# Patient Record
Sex: FEMALE | Race: WHITE | NOT HISPANIC OR LATINO | URBAN - METROPOLITAN AREA
[De-identification: names, ages, dates, MRNs, and addresses within clinical notes are randomized per-mention and may not be internally consistent; named-entity substitution may affect disease eponyms.]

---

## 2019-01-11 ENCOUNTER — COMMUNICATION - HEALTHEAST (OUTPATIENT)
Dept: SCHEDULING | Facility: CLINIC | Age: 79
End: 2019-01-11

## 2019-01-14 ENCOUNTER — OFFICE VISIT - HEALTHEAST (OUTPATIENT)
Dept: FAMILY MEDICINE | Facility: CLINIC | Age: 79
End: 2019-01-14

## 2019-01-14 DIAGNOSIS — H40.1190 PRIMARY OPEN ANGLE GLAUCOMA, UNSPECIFIED GLAUCOMA STAGE, UNSPECIFIED LATERALITY: ICD-10-CM

## 2019-01-14 DIAGNOSIS — E03.9 HYPOTHYROIDISM, UNSPECIFIED TYPE: ICD-10-CM

## 2019-01-14 DIAGNOSIS — Z13.220 SCREENING FOR LIPID DISORDERS: ICD-10-CM

## 2019-01-14 LAB
ANION GAP SERPL CALCULATED.3IONS-SCNC: 10 MMOL/L (ref 5–18)
BUN SERPL-MCNC: 11 MG/DL (ref 8–28)
CALCIUM SERPL-MCNC: 9.2 MG/DL (ref 8.5–10.5)
CHLORIDE BLD-SCNC: 102 MMOL/L (ref 98–107)
CHOLEST SERPL-MCNC: 228 MG/DL
CO2 SERPL-SCNC: 28 MMOL/L (ref 22–31)
CREAT SERPL-MCNC: 0.65 MG/DL (ref 0.6–1.1)
FASTING STATUS PATIENT QL REPORTED: YES
GFR SERPL CREATININE-BSD FRML MDRD: >60 ML/MIN/1.73M2
GLUCOSE BLD-MCNC: 97 MG/DL (ref 70–125)
HDLC SERPL-MCNC: 56 MG/DL
LDLC SERPL CALC-MCNC: 153 MG/DL
POTASSIUM BLD-SCNC: 4 MMOL/L (ref 3.5–5)
SODIUM SERPL-SCNC: 140 MMOL/L (ref 136–145)
TRIGL SERPL-MCNC: 97 MG/DL
TSH SERPL DL<=0.005 MIU/L-ACNC: 16.42 UIU/ML (ref 0.3–5)

## 2019-01-14 RX ORDER — LYSINE 500 MG
500 TABLET ORAL DAILY
Status: SHIPPED | COMMUNITY
Start: 2019-01-14

## 2019-01-14 RX ORDER — LATANOPROST 50 UG/ML
1 SOLUTION/ DROPS OPHTHALMIC AT BEDTIME
Status: SHIPPED | COMMUNITY
Start: 2019-01-11

## 2019-01-14 RX ORDER — TIMOLOL MALEATE 5 MG/ML
1 SOLUTION/ DROPS OPHTHALMIC 2 TIMES DAILY
Status: SHIPPED | COMMUNITY
Start: 2019-01-11

## 2019-01-15 ENCOUNTER — AMBULATORY - HEALTHEAST (OUTPATIENT)
Dept: FAMILY MEDICINE | Facility: CLINIC | Age: 79
End: 2019-01-15

## 2019-01-15 ENCOUNTER — COMMUNICATION - HEALTHEAST (OUTPATIENT)
Dept: FAMILY MEDICINE | Facility: CLINIC | Age: 79
End: 2019-01-15

## 2019-01-15 DIAGNOSIS — E03.9 HYPOTHYROIDISM, UNSPECIFIED TYPE: ICD-10-CM

## 2019-02-22 ENCOUNTER — COMMUNICATION - HEALTHEAST (OUTPATIENT)
Dept: FAMILY MEDICINE | Facility: CLINIC | Age: 79
End: 2019-02-22

## 2019-02-22 DIAGNOSIS — E03.9 HYPOTHYROIDISM, UNSPECIFIED TYPE: ICD-10-CM

## 2019-05-22 ENCOUNTER — AMBULATORY - HEALTHEAST (OUTPATIENT)
Dept: FAMILY MEDICINE | Facility: CLINIC | Age: 79
End: 2019-05-22

## 2019-05-22 ENCOUNTER — COMMUNICATION - HEALTHEAST (OUTPATIENT)
Dept: SCHEDULING | Facility: CLINIC | Age: 79
End: 2019-05-22

## 2019-05-22 DIAGNOSIS — N39.0 URINARY TRACT INFECTION WITHOUT HEMATURIA, SITE UNSPECIFIED: ICD-10-CM

## 2019-06-24 ENCOUNTER — OFFICE VISIT - HEALTHEAST (OUTPATIENT)
Dept: FAMILY MEDICINE | Facility: CLINIC | Age: 79
End: 2019-06-24

## 2019-06-24 DIAGNOSIS — N30.01 ACUTE CYSTITIS WITH HEMATURIA: ICD-10-CM

## 2019-06-24 LAB
ALBUMIN UR-MCNC: ABNORMAL MG/DL
APPEARANCE UR: CLEAR
BACTERIA #/AREA URNS HPF: ABNORMAL HPF
BILIRUB UR QL STRIP: NEGATIVE
COLOR UR AUTO: YELLOW
GLUCOSE UR STRIP-MCNC: NEGATIVE MG/DL
HGB UR QL STRIP: ABNORMAL
KETONES UR STRIP-MCNC: NEGATIVE MG/DL
LEUKOCYTE ESTERASE UR QL STRIP: ABNORMAL
NITRATE UR QL: NEGATIVE
PH UR STRIP: 7 [PH] (ref 5–8)
RBC #/AREA URNS AUTO: ABNORMAL HPF
SP GR UR STRIP: 1.01 (ref 1–1.03)
SQUAMOUS #/AREA URNS AUTO: ABNORMAL LPF
UROBILINOGEN UR STRIP-ACNC: ABNORMAL
WBC #/AREA URNS AUTO: ABNORMAL HPF
WBC CLUMPS #/AREA URNS HPF: PRESENT /[HPF]

## 2019-06-26 ENCOUNTER — COMMUNICATION - HEALTHEAST (OUTPATIENT)
Dept: FAMILY MEDICINE | Facility: CLINIC | Age: 79
End: 2019-06-26

## 2019-06-26 LAB — BACTERIA SPEC CULT: NO GROWTH

## 2019-07-08 ENCOUNTER — OFFICE VISIT - HEALTHEAST (OUTPATIENT)
Dept: FAMILY MEDICINE | Facility: CLINIC | Age: 79
End: 2019-07-08

## 2019-07-08 DIAGNOSIS — E03.9 HYPOTHYROIDISM, UNSPECIFIED TYPE: ICD-10-CM

## 2019-07-08 DIAGNOSIS — R30.0 DYSURIA: ICD-10-CM

## 2019-07-08 DIAGNOSIS — N95.2 VAGINAL ATROPHY: ICD-10-CM

## 2019-07-08 LAB
ALBUMIN UR-MCNC: NEGATIVE MG/DL
APPEARANCE UR: CLEAR
BILIRUB UR QL STRIP: NEGATIVE
COLOR UR AUTO: YELLOW
GLUCOSE UR STRIP-MCNC: NEGATIVE MG/DL
HGB UR QL STRIP: NEGATIVE
KETONES UR STRIP-MCNC: NEGATIVE MG/DL
LEUKOCYTE ESTERASE UR QL STRIP: NEGATIVE
NITRATE UR QL: NEGATIVE
PH UR STRIP: 7.5 [PH] (ref 5–8)
SP GR UR STRIP: 1.01 (ref 1–1.03)
TSH SERPL DL<=0.005 MIU/L-ACNC: 4.57 UIU/ML (ref 0.3–5)
UROBILINOGEN UR STRIP-ACNC: NORMAL

## 2019-07-09 ENCOUNTER — COMMUNICATION - HEALTHEAST (OUTPATIENT)
Dept: FAMILY MEDICINE | Facility: CLINIC | Age: 79
End: 2019-07-09

## 2019-07-30 ENCOUNTER — COMMUNICATION - HEALTHEAST (OUTPATIENT)
Dept: FAMILY MEDICINE | Facility: CLINIC | Age: 79
End: 2019-07-30

## 2019-07-30 DIAGNOSIS — E03.9 HYPOTHYROIDISM, UNSPECIFIED TYPE: ICD-10-CM

## 2019-08-19 ENCOUNTER — OFFICE VISIT - HEALTHEAST (OUTPATIENT)
Dept: FAMILY MEDICINE | Facility: CLINIC | Age: 79
End: 2019-08-19

## 2019-08-19 DIAGNOSIS — N95.2 VAGINAL ATROPHY: ICD-10-CM

## 2019-08-19 DIAGNOSIS — M54.6 BILATERAL THORACIC BACK PAIN, UNSPECIFIED CHRONICITY: ICD-10-CM

## 2019-09-10 ENCOUNTER — RECORDS - HEALTHEAST (OUTPATIENT)
Dept: ADMINISTRATIVE | Facility: OTHER | Age: 79
End: 2019-09-10

## 2019-09-10 ENCOUNTER — AMBULATORY - HEALTHEAST (OUTPATIENT)
Dept: LAB | Facility: CLINIC | Age: 79
End: 2019-09-10

## 2019-09-10 DIAGNOSIS — H49.22 SIXTH NERVE PALSY OF LEFT EYE: ICD-10-CM

## 2019-09-10 LAB
BASOPHILS # BLD AUTO: 0 THOU/UL (ref 0–0.2)
BASOPHILS NFR BLD AUTO: 0 % (ref 0–2)
C REACTIVE PROTEIN LHE: <0.1 MG/DL (ref 0–0.8)
EOSINOPHIL # BLD AUTO: 0.2 THOU/UL (ref 0–0.4)
EOSINOPHIL NFR BLD AUTO: 3 % (ref 0–6)
ERYTHROCYTE [DISTWIDTH] IN BLOOD BY AUTOMATED COUNT: 12.3 % (ref 11–14.5)
ERYTHROCYTE [SEDIMENTATION RATE] IN BLOOD BY WESTERGREN METHOD: 13 MM/HR (ref 0–20)
HCT VFR BLD AUTO: 41.1 % (ref 35–47)
HGB BLD-MCNC: 13.7 G/DL (ref 12–16)
LYMPHOCYTES # BLD AUTO: 1.9 THOU/UL (ref 0.8–4.4)
LYMPHOCYTES NFR BLD AUTO: 35 % (ref 20–40)
MCH RBC QN AUTO: 31.2 PG (ref 27–34)
MCHC RBC AUTO-ENTMCNC: 33.3 G/DL (ref 32–36)
MCV RBC AUTO: 94 FL (ref 80–100)
MONOCYTES # BLD AUTO: 0.5 THOU/UL (ref 0–0.9)
MONOCYTES NFR BLD AUTO: 9 % (ref 2–10)
NEUTROPHILS # BLD AUTO: 2.8 THOU/UL (ref 2–7.7)
NEUTROPHILS NFR BLD AUTO: 52 % (ref 50–70)
PLATELET # BLD AUTO: 205 THOU/UL (ref 140–440)
PMV BLD AUTO: 8.3 FL (ref 7–10)
RBC # BLD AUTO: 4.4 MILL/UL (ref 3.8–5.4)
WBC: 5.3 THOU/UL (ref 4–11)

## 2019-09-11 ENCOUNTER — COMMUNICATION - HEALTHEAST (OUTPATIENT)
Dept: PEDIATRICS | Facility: CLINIC | Age: 79
End: 2019-09-11

## 2019-10-15 ENCOUNTER — AMBULATORY - HEALTHEAST (OUTPATIENT)
Dept: NURSING | Facility: CLINIC | Age: 79
End: 2019-10-15

## 2019-10-15 DIAGNOSIS — Z23 FLU VACCINE NEED: ICD-10-CM

## 2019-12-19 ENCOUNTER — COMMUNICATION - HEALTHEAST (OUTPATIENT)
Dept: SCHEDULING | Facility: CLINIC | Age: 79
End: 2019-12-19

## 2019-12-19 DIAGNOSIS — E03.9 HYPOTHYROIDISM, UNSPECIFIED TYPE: ICD-10-CM

## 2020-04-29 ENCOUNTER — COMMUNICATION - HEALTHEAST (OUTPATIENT)
Dept: FAMILY MEDICINE | Facility: CLINIC | Age: 80
End: 2020-04-29

## 2020-04-29 DIAGNOSIS — E03.9 HYPOTHYROIDISM, UNSPECIFIED TYPE: ICD-10-CM

## 2020-05-13 ENCOUNTER — COMMUNICATION - HEALTHEAST (OUTPATIENT)
Dept: FAMILY MEDICINE | Facility: CLINIC | Age: 80
End: 2020-05-13

## 2020-05-28 ENCOUNTER — SURGERY - HEALTHEAST (OUTPATIENT)
Dept: SURGERY | Facility: CLINIC | Age: 80
End: 2020-05-28

## 2020-05-28 ENCOUNTER — ANESTHESIA - HEALTHEAST (OUTPATIENT)
Dept: SURGERY | Facility: CLINIC | Age: 80
End: 2020-05-28

## 2020-05-28 ASSESSMENT — MIFFLIN-ST. JEOR
SCORE: 887.28
SCORE: 884.9

## 2020-05-30 ASSESSMENT — MIFFLIN-ST. JEOR: SCORE: 887.73

## 2020-05-31 ASSESSMENT — MIFFLIN-ST. JEOR: SCORE: 899.98

## 2020-06-01 ENCOUNTER — OFFICE VISIT - HEALTHEAST (OUTPATIENT)
Dept: GERIATRICS | Facility: CLINIC | Age: 80
End: 2020-06-01

## 2020-06-01 DIAGNOSIS — Z96.641 HISTORY OF RIGHT HIP HEMIARTHROPLASTY: ICD-10-CM

## 2020-06-01 DIAGNOSIS — D62 ACUTE BLOOD LOSS ANEMIA: ICD-10-CM

## 2020-06-01 DIAGNOSIS — S72.001A CLOSED DISPLACED FRACTURE OF RIGHT FEMORAL NECK (H): ICD-10-CM

## 2020-06-01 DIAGNOSIS — R52 PAIN MANAGEMENT: ICD-10-CM

## 2020-06-02 ENCOUNTER — OFFICE VISIT - HEALTHEAST (OUTPATIENT)
Dept: GERIATRICS | Facility: CLINIC | Age: 80
End: 2020-06-02

## 2020-06-02 ENCOUNTER — RECORDS - HEALTHEAST (OUTPATIENT)
Dept: LAB | Facility: CLINIC | Age: 80
End: 2020-06-02

## 2020-06-02 DIAGNOSIS — R52 PAIN MANAGEMENT: ICD-10-CM

## 2020-06-02 DIAGNOSIS — S72.001A CLOSED DISPLACED FRACTURE OF RIGHT FEMORAL NECK (H): ICD-10-CM

## 2020-06-02 DIAGNOSIS — D62 ACUTE BLOOD LOSS ANEMIA: ICD-10-CM

## 2020-06-02 DIAGNOSIS — Z96.641 HISTORY OF RIGHT HIP HEMIARTHROPLASTY: ICD-10-CM

## 2020-06-02 LAB
CALCIUM SERPL-MCNC: 7.7 MG/DL (ref 8.5–10.5)
HGB BLD-MCNC: 9.4 G/DL (ref 12–16)

## 2020-06-04 ENCOUNTER — OFFICE VISIT - HEALTHEAST (OUTPATIENT)
Dept: GERIATRICS | Facility: CLINIC | Age: 80
End: 2020-06-04

## 2020-06-04 DIAGNOSIS — S72.001A CLOSED DISPLACED FRACTURE OF RIGHT FEMORAL NECK (H): ICD-10-CM

## 2020-06-04 DIAGNOSIS — R52 PAIN MANAGEMENT: ICD-10-CM

## 2020-06-04 DIAGNOSIS — D62 ACUTE BLOOD LOSS ANEMIA: ICD-10-CM

## 2020-06-04 DIAGNOSIS — Z96.641 HISTORY OF RIGHT HIP HEMIARTHROPLASTY: ICD-10-CM

## 2020-06-09 ENCOUNTER — OFFICE VISIT - HEALTHEAST (OUTPATIENT)
Dept: GERIATRICS | Facility: CLINIC | Age: 80
End: 2020-06-09

## 2020-06-09 DIAGNOSIS — Z96.641 HISTORY OF RIGHT HIP HEMIARTHROPLASTY: ICD-10-CM

## 2020-06-09 DIAGNOSIS — D62 ACUTE BLOOD LOSS ANEMIA: ICD-10-CM

## 2020-06-09 DIAGNOSIS — S72.001A CLOSED DISPLACED FRACTURE OF RIGHT FEMORAL NECK (H): ICD-10-CM

## 2020-06-11 ENCOUNTER — OFFICE VISIT - HEALTHEAST (OUTPATIENT)
Dept: GERIATRICS | Facility: CLINIC | Age: 80
End: 2020-06-11

## 2020-06-11 DIAGNOSIS — H04.123 DRY EYES: ICD-10-CM

## 2020-06-11 DIAGNOSIS — D62 ACUTE BLOOD LOSS ANEMIA: ICD-10-CM

## 2020-06-11 DIAGNOSIS — Z96.641 HISTORY OF RIGHT HIP HEMIARTHROPLASTY: ICD-10-CM

## 2020-06-11 DIAGNOSIS — S72.001A CLOSED DISPLACED FRACTURE OF RIGHT FEMORAL NECK (H): ICD-10-CM

## 2020-06-15 ENCOUNTER — RECORDS - HEALTHEAST (OUTPATIENT)
Dept: LAB | Facility: CLINIC | Age: 80
End: 2020-06-15

## 2020-06-15 LAB — HGB BLD-MCNC: 11.3 G/DL (ref 12–16)

## 2020-06-16 ENCOUNTER — OFFICE VISIT - HEALTHEAST (OUTPATIENT)
Dept: GERIATRICS | Facility: CLINIC | Age: 80
End: 2020-06-16

## 2020-06-16 DIAGNOSIS — Z96.641 HISTORY OF RIGHT HIP HEMIARTHROPLASTY: ICD-10-CM

## 2020-06-16 DIAGNOSIS — D62 ACUTE BLOOD LOSS ANEMIA: ICD-10-CM

## 2020-06-16 DIAGNOSIS — S72.001A CLOSED DISPLACED FRACTURE OF RIGHT FEMORAL NECK (H): ICD-10-CM

## 2020-06-16 DIAGNOSIS — H04.123 DRY EYES: ICD-10-CM

## 2020-06-16 DIAGNOSIS — R52 PAIN MANAGEMENT: ICD-10-CM

## 2020-06-18 ENCOUNTER — AMBULATORY - HEALTHEAST (OUTPATIENT)
Dept: GERIATRICS | Facility: CLINIC | Age: 80
End: 2020-06-18

## 2020-06-18 ENCOUNTER — COMMUNICATION - HEALTHEAST (OUTPATIENT)
Dept: GERIATRICS | Facility: CLINIC | Age: 80
End: 2020-06-18

## 2020-07-13 ENCOUNTER — RECORDS - HEALTHEAST (OUTPATIENT)
Dept: ADMINISTRATIVE | Facility: OTHER | Age: 80
End: 2020-07-13

## 2020-08-12 ENCOUNTER — COMMUNICATION - HEALTHEAST (OUTPATIENT)
Dept: FAMILY MEDICINE | Facility: CLINIC | Age: 80
End: 2020-08-12

## 2020-08-12 DIAGNOSIS — E03.9 HYPOTHYROIDISM, UNSPECIFIED TYPE: ICD-10-CM

## 2020-08-24 ENCOUNTER — RECORDS - HEALTHEAST (OUTPATIENT)
Dept: ADMINISTRATIVE | Facility: OTHER | Age: 80
End: 2020-08-24

## 2020-09-30 ENCOUNTER — OFFICE VISIT - HEALTHEAST (OUTPATIENT)
Dept: INTERNAL MEDICINE | Facility: CLINIC | Age: 80
End: 2020-09-30

## 2020-09-30 DIAGNOSIS — S72.001A CLOSED DISPLACED FRACTURE OF RIGHT FEMORAL NECK (H): ICD-10-CM

## 2020-09-30 DIAGNOSIS — H40.003 GLAUCOMA SUSPECT, BILATERAL: ICD-10-CM

## 2020-09-30 DIAGNOSIS — Z13.29 SCREENING FOR ENDOCRINE, NUTRITIONAL, METABOLIC AND IMMUNITY DISORDER: ICD-10-CM

## 2020-09-30 DIAGNOSIS — Z13.0 SCREENING FOR ENDOCRINE, NUTRITIONAL, METABOLIC AND IMMUNITY DISORDER: ICD-10-CM

## 2020-09-30 DIAGNOSIS — Z13.228 SCREENING FOR ENDOCRINE, NUTRITIONAL, METABOLIC AND IMMUNITY DISORDER: ICD-10-CM

## 2020-09-30 DIAGNOSIS — E03.9 HYPOTHYROIDISM, UNSPECIFIED TYPE: ICD-10-CM

## 2020-09-30 DIAGNOSIS — Z13.21 SCREENING FOR ENDOCRINE, NUTRITIONAL, METABOLIC AND IMMUNITY DISORDER: ICD-10-CM

## 2020-09-30 DIAGNOSIS — Z00.00 MEDICARE ANNUAL WELLNESS VISIT, SUBSEQUENT: ICD-10-CM

## 2020-09-30 DIAGNOSIS — Z78.0 ASYMPTOMATIC MENOPAUSAL STATE: ICD-10-CM

## 2020-09-30 LAB
ALBUMIN SERPL-MCNC: 3.6 G/DL (ref 3.5–5)
ALP SERPL-CCNC: 124 U/L (ref 45–120)
ALT SERPL W P-5'-P-CCNC: 11 U/L (ref 0–45)
ANION GAP SERPL CALCULATED.3IONS-SCNC: 7 MMOL/L (ref 5–18)
AST SERPL W P-5'-P-CCNC: 22 U/L (ref 0–40)
BASOPHILS # BLD AUTO: 0 THOU/UL (ref 0–0.2)
BASOPHILS NFR BLD AUTO: 0 % (ref 0–2)
BILIRUB SERPL-MCNC: 0.7 MG/DL (ref 0–1)
BUN SERPL-MCNC: 13 MG/DL (ref 8–28)
CALCIUM SERPL-MCNC: 9 MG/DL (ref 8.5–10.5)
CHLORIDE BLD-SCNC: 103 MMOL/L (ref 98–107)
CHOLEST SERPL-MCNC: 232 MG/DL
CO2 SERPL-SCNC: 30 MMOL/L (ref 22–31)
CREAT SERPL-MCNC: 0.59 MG/DL (ref 0.6–1.1)
EOSINOPHIL # BLD AUTO: 0.3 THOU/UL (ref 0–0.4)
EOSINOPHIL NFR BLD AUTO: 4 % (ref 0–6)
ERYTHROCYTE [DISTWIDTH] IN BLOOD BY AUTOMATED COUNT: 12.9 % (ref 11–14.5)
FASTING STATUS PATIENT QL REPORTED: YES
GFR SERPL CREATININE-BSD FRML MDRD: >60 ML/MIN/1.73M2
GLUCOSE BLD-MCNC: 99 MG/DL (ref 70–125)
HCT VFR BLD AUTO: 38.7 % (ref 35–47)
HDLC SERPL-MCNC: 57 MG/DL
HGB BLD-MCNC: 12.6 G/DL (ref 12–16)
LDLC SERPL CALC-MCNC: 164 MG/DL
LYMPHOCYTES # BLD AUTO: 2.1 THOU/UL (ref 0.8–4.4)
LYMPHOCYTES NFR BLD AUTO: 36 % (ref 20–40)
MCH RBC QN AUTO: 28.9 PG (ref 27–34)
MCHC RBC AUTO-ENTMCNC: 32.5 G/DL (ref 32–36)
MCV RBC AUTO: 89 FL (ref 80–100)
MONOCYTES # BLD AUTO: 0.4 THOU/UL (ref 0–0.9)
MONOCYTES NFR BLD AUTO: 7 % (ref 2–10)
NEUTROPHILS # BLD AUTO: 3.1 THOU/UL (ref 2–7.7)
NEUTROPHILS NFR BLD AUTO: 52 % (ref 50–70)
PLATELET # BLD AUTO: 303 THOU/UL (ref 140–440)
PMV BLD AUTO: 7.9 FL (ref 7–10)
POTASSIUM BLD-SCNC: 3.9 MMOL/L (ref 3.5–5)
PROT SERPL-MCNC: 6.8 G/DL (ref 6–8)
RBC # BLD AUTO: 4.34 MILL/UL (ref 3.8–5.4)
SODIUM SERPL-SCNC: 140 MMOL/L (ref 136–145)
TRIGL SERPL-MCNC: 56 MG/DL
TSH SERPL DL<=0.005 MIU/L-ACNC: 22.02 UIU/ML (ref 0.3–5)
WBC: 6 THOU/UL (ref 4–11)

## 2020-09-30 ASSESSMENT — MIFFLIN-ST. JEOR: SCORE: 912.67

## 2020-10-01 ENCOUNTER — COMMUNICATION - HEALTHEAST (OUTPATIENT)
Dept: INTERNAL MEDICINE | Facility: CLINIC | Age: 80
End: 2020-10-01

## 2020-10-30 ENCOUNTER — COMMUNICATION - HEALTHEAST (OUTPATIENT)
Dept: INTERNAL MEDICINE | Facility: CLINIC | Age: 80
End: 2020-10-30

## 2020-10-30 DIAGNOSIS — E03.9 HYPOTHYROIDISM, UNSPECIFIED TYPE: ICD-10-CM

## 2020-11-27 ENCOUNTER — RECORDS - HEALTHEAST (OUTPATIENT)
Dept: ADMINISTRATIVE | Facility: OTHER | Age: 80
End: 2020-11-27

## 2020-11-27 ENCOUNTER — RECORDS - HEALTHEAST (OUTPATIENT)
Dept: BONE DENSITY | Facility: CLINIC | Age: 80
End: 2020-11-27

## 2020-11-27 DIAGNOSIS — Z78.0 ASYMPTOMATIC MENOPAUSAL STATE: ICD-10-CM

## 2020-11-30 ENCOUNTER — OFFICE VISIT - HEALTHEAST (OUTPATIENT)
Dept: INTERNAL MEDICINE | Facility: CLINIC | Age: 80
End: 2020-11-30

## 2020-11-30 DIAGNOSIS — E03.9 HYPOTHYROIDISM, UNSPECIFIED TYPE: ICD-10-CM

## 2020-11-30 DIAGNOSIS — H40.003 GLAUCOMA SUSPECT, BILATERAL: ICD-10-CM

## 2020-11-30 LAB
T4 FREE SERPL-MCNC: 1.3 NG/DL (ref 0.7–1.8)
TSH SERPL DL<=0.005 MIU/L-ACNC: 4.34 UIU/ML (ref 0.3–5)

## 2020-12-03 ENCOUNTER — COMMUNICATION - HEALTHEAST (OUTPATIENT)
Dept: INTERNAL MEDICINE | Facility: CLINIC | Age: 80
End: 2020-12-03

## 2020-12-27 ENCOUNTER — COMMUNICATION - HEALTHEAST (OUTPATIENT)
Dept: INTERNAL MEDICINE | Facility: CLINIC | Age: 80
End: 2020-12-27

## 2020-12-27 DIAGNOSIS — E03.9 HYPOTHYROIDISM, UNSPECIFIED TYPE: ICD-10-CM

## 2021-03-01 ENCOUNTER — COMMUNICATION - HEALTHEAST (OUTPATIENT)
Dept: INTERNAL MEDICINE | Facility: CLINIC | Age: 81
End: 2021-03-01

## 2021-03-01 DIAGNOSIS — E03.9 HYPOTHYROIDISM, UNSPECIFIED TYPE: ICD-10-CM

## 2021-03-02 RX ORDER — LEVOTHYROXINE SODIUM 112 UG/1
TABLET ORAL
Qty: 90 TABLET | Refills: 2 | Status: SHIPPED | OUTPATIENT
Start: 2021-03-02

## 2021-04-07 ENCOUNTER — TELEPHONE (OUTPATIENT)
Dept: SCHEDULING | Facility: CLINIC | Age: 81
End: 2021-04-07

## 2021-05-27 VITALS
DIASTOLIC BLOOD PRESSURE: 66 MMHG | RESPIRATION RATE: 18 BRPM | OXYGEN SATURATION: 95 % | HEART RATE: 89 BPM | SYSTOLIC BLOOD PRESSURE: 111 MMHG | TEMPERATURE: 97.9 F

## 2021-05-29 NOTE — PATIENT INSTRUCTIONS - HE
Your urine test shows evidence of a urinary tract infection.    We will treat you with an antibiotic, nitrofurantoin.  I sent this to your pharmacy. Please take as directed for 5 days. Please take a probiotic or eat a daily Greek yogurt while you are on the antibiotic.    A urine culture is still in process, it usually takes about 2 days and identifies the bacteria causing the infection.  It also tests antibiotics to make sure what we use will be effective for your infection.  We will only call you if the results of this test show a need to change your treatment plan.    If developing high fevers, vomiting, abdominal pain, or any other new, concerning symptoms, come back immediately. See Dr. Roe next week for a recheck and to ensure that the blood has cleared from your urine.    Otherwise, simply follow up as needed.        Urinary Tract Infections in Women  Urinary tract infections (UTIs) are most often caused by bacteria (germs). These bacteria enter the urinary tract. The bacteria may come from outside the body. Or they may travel from the skin outside the rectum or vagina into the urethra. Female anatomy makes it easier for bacteria from the bowel to enter a woman s urinary tract, which is the most common source of UTI. This means women develop UTIs more often than men. Pain in or around the urinary tract is a common UTI symptom. But the only way to know for sure if you have a UTI for the health care provider to test your urine. The two tests that may be done are the urinalysis and urine culture.  Types of UTIs    Cystitis: A bladder infection (cystitis) is the most common UTI in women. You may have urgent or frequent urination. You may also have pain, burning when you urinate, and bloody urine.    Urethritis: This is an inflamed urethra, which is the tube that carries urine from the bladder to outside the body. You may have lower stomach or back pain. You may also have urgent or frequent  urination.    Pyelonephritis: This is a kidney infection. If not treated, it can be serious and damage your kidneys. In severe cases, you may be hospitalized. You may have a fever and lower back pain.  Medications to treat a UTI  Most UTIs are treated with antibiotics. These kill the bacteria. The length of time you need to take them depends on the type of infection. It may be as short as 3 days. If you have repeated UTIs, a low-dose antibiotic may be needed for several months. Take antibiotics exactly as directed. Don t stop taking them until all of the medication is gone. If you stop taking the antibiotic too soon, the infection may not go away, and you may develop a resistance to the antibiotic. This can make it much harder to treat.  Lifestyle changes to treat and prevent UTIs  The lifestyle changes below will help get rid of your UTI. They may also help prevent future UTIs.    Drink plenty of fluids. This includes water, juice, or other caffeine-free drinks. Fluids help flush bacteria out of your body.    Empty your bladder. Always empty your bladder when you feel the urge to urinate. And always urinate before going to sleep. Urine that stays in your bladder can lead to infection. Try to urinate before and after sex as well.    Practice good personal hygiene. Wipe yourself from front to back after using the toilet. This helps keep bacteria from getting into the urethra.    Use condoms during sex. These help prevent UTIs caused by sexually transmitted bacteria. Also, avoid using spermicides during sex. These can increase the risk of UTIs. Choose other forms of birth control instead. For women who tend to get UTIs after sex, a low-dose of a preventive antibiotic may be used. Be sure to discuss this option with your health care provider.    Follow up with your health care provider as directed. He or she may test to make sure the infection has cleared. If necessary, additional treatment may be started.    7496-7187  [employer]. [address] [place] [address], [place] [address]. All rights reserved. This information is not intended as a substitute for professional medical care. Always follow your healthcare professional's instructions.

## 2021-05-29 NOTE — TELEPHONE ENCOUNTER
"  CC:  \"Symptoms of a UTI\"      Caller did not want to make an appt and would like provider to Rx ABX without seeing her       Thinks the last one was 1980s     Sx started today   > Frequency   > Urgency   > Painful urination   > No fever   > No back pain   > No blood in urine    > No odor       A/P:   > I can message provider to see if they would be willing to prescribe ABX without eval or UA       Confirmed allergies tele# and pharmacy        Mina Laurent, RN   Triage and Medication Refills          Reason for Disposition    Age > 50 years    Protocols used: URINATION PAIN - FEMALE-A-OH      "

## 2021-05-29 NOTE — PROGRESS NOTES
ASSESSMENT:   1. Acute cystitis with hematuria  Urinalysis- if Indicated    Culture, Urine    nitrofurantoin, macrocrystal-monohydrate, (MACROBID) 100 MG capsule       PLAN:  History and laboratory analysis are indicating a urinary tract infection. Urinalysis indicates presence of protein, WBC, RBC, blood, bacteria, and WBC clumps. Culture is still pending. Patient was instructed that we will call her if culture results return indicating a change in her antibiotic therapy.  Patient provided a prescription for nitrofurantoin, Macrobid, to be taken twice a day for five days.  Patient with normal renal function, recently completed a 5-day course of Bactrim so this could likely represent treatment failure.  Patient also with multiple antibiotic allergies, at this point feel nitrofurantoin would be the best course of action.  Patient encouraged to follow-up with her primary care provider within the next week for an evaluation of treatment effectiveness.    I discussed red flag symptoms, return precautions to clinic/ER and follow up care with patient.  Expected clinical course, symptomatic cares advised. Questions answered. Patient amenable with plan.    Patient Instructions:  Patient Instructions   Your urine test shows evidence of a urinary tract infection.    We will treat you with an antibiotic, nitrofurantoin.  I sent this to your pharmacy. Please take as directed for 5 days. Please take a probiotic or eat a daily Greek yogurt while you are on the antibiotic.    A urine culture is still in process, it usually takes about 2 days and identifies the bacteria causing the infection.  It also tests antibiotics to make sure what we use will be effective for your infection.  We will only call you if the results of this test show a need to change your treatment plan.    If developing high fevers, vomiting, abdominal pain, or any other new, concerning symptoms, come back immediately. See Dr. Roe next week for a recheck  and to ensure that the blood has cleared from your urine.    Otherwise, simply follow up as needed.        Urinary Tract Infections in Women  Urinary tract infections (UTIs) are most often caused by bacteria (germs). These bacteria enter the urinary tract. The bacteria may come from outside the body. Or they may travel from the skin outside the rectum or vagina into the urethra. Female anatomy makes it easier for bacteria from the bowel to enter a woman s urinary tract, which is the most common source of UTI. This means women develop UTIs more often than men. Pain in or around the urinary tract is a common UTI symptom. But the only way to know for sure if you have a UTI for the health care provider to test your urine. The two tests that may be done are the urinalysis and urine culture.  Types of UTIs    Cystitis: A bladder infection (cystitis) is the most common UTI in women. You may have urgent or frequent urination. You may also have pain, burning when you urinate, and bloody urine.    Urethritis: This is an inflamed urethra, which is the tube that carries urine from the bladder to outside the body. You may have lower stomach or back pain. You may also have urgent or frequent urination.    Pyelonephritis: This is a kidney infection. If not treated, it can be serious and damage your kidneys. In severe cases, you may be hospitalized. You may have a fever and lower back pain.  Medications to treat a UTI  Most UTIs are treated with antibiotics. These kill the bacteria. The length of time you need to take them depends on the type of infection. It may be as short as 3 days. If you have repeated UTIs, a low-dose antibiotic may be needed for several months. Take antibiotics exactly as directed. Don t stop taking them until all of the medication is gone. If you stop taking the antibiotic too soon, the infection may not go away, and you may develop a resistance to the antibiotic. This can make it much harder to  treat.  Lifestyle changes to treat and prevent UTIs  The lifestyle changes below will help get rid of your UTI. They may also help prevent future UTIs.    Drink plenty of fluids. This includes water, juice, or other caffeine-free drinks. Fluids help flush bacteria out of your body.    Empty your bladder. Always empty your bladder when you feel the urge to urinate. And always urinate before going to sleep. Urine that stays in your bladder can lead to infection. Try to urinate before and after sex as well.    Practice good personal hygiene. Wipe yourself from front to back after using the toilet. This helps keep bacteria from getting into the urethra.    Use condoms during sex. These help prevent UTIs caused by sexually transmitted bacteria. Also, avoid using spermicides during sex. These can increase the risk of UTIs. Choose other forms of birth control instead. For women who tend to get UTIs after sex, a low-dose of a preventive antibiotic may be used. Be sure to discuss this option with your health care provider.    Follow up with your health care provider as directed. He or she may test to make sure the infection has cleared. If necessary, additional treatment may be started.    4377-2628 [employer]. [address] [place] [address]. All rights reserved. This information is not intended as a substitute for professional medical care. Always follow your healthcare professional's instructions.          SUBJECTIVE:   Jaye Soto is a 78 y.o. female who presents alone today with symptoms of urinary frequency, urgency and pain with urination that started this am. She reports that she treated via the RN phone triage for urinary tract symptoms on 5/22/19 with bactrim. She reports a complete relief of symptoms at that time. Prior to this, she has not had a UTI since 1980s. Denies fever, odor, vaginal discharge. Has tried pushing cranberry juice without relief.     ROS:  Comprehensive 12 pt ROS  completed, positives noted in HPI, otherwise negative.      Past Medical History:  Patient Active Problem List   Diagnosis     Hypothyroidism     Glaucoma       Surgical History:  Past Surgical History:   Procedure Laterality Date     ABDOMINOPLASTY       EYE SURGERY Bilateral     for glaucoma     HYSTERECTOMY      Prolapsed uterus, ovaries in     MENISCECTOMY Right      TONSILLECTOMY             Family History:  Family History   Problem Relation Age of Onset     Glaucoma Father        Reviewed; Non-contributory    Social History     Tobacco Use   Smoking Status Never Smoker   Smokeless Tobacco Never Used       Smoking: denies  Occupation: retired Infectious Disease RN    Living situation: Independent senior living apartment    Current Medications:  Current Outpatient Medications on File Prior to Visit   Medication Sig Dispense Refill     cholecalciferol, vitamin D3, (VITAMIN D3) 1,000 unit capsule Take 1,000 Units by mouth daily.       cyanocobalamin 100 MCG tablet Take 100 mcg by mouth daily.       latanoprost (XALATAN) 0.005 % ophthalmic solution        levothyroxine (SYNTHROID) 100 MCG tablet Take 1 tablet (100 mcg total) by mouth daily. 90 tablet 1     LUTEIN EXTRACT-ZEAXANTHIN EXT ORAL Take by mouth.       lysine 500 mg Tab Take by mouth daily.       timolol maleate (TIMOPTIC) 0.5 % ophthalmic solution        turmeric/turmeric ext/pepr ext (TURMERIC-TURMERIC EXT-PEPPER) 500-3 mg cap Take by mouth.       ubidecarenone/vitamin E mixed (COQ10  ORAL) Take by mouth.       magnesium 250 mg Tab Take by mouth.       No current facility-administered medications on file prior to visit.        Allergies:   Allergies   Allergen Reactions     Ceftin [Cefuroxime Axetil] Anaphylaxis     Tongue swelling     Dorzolamide Shortness Of Breath and Itching     Short of breath,      Naprosyn [Naproxen] Anaphylaxis     Tongue swelling, throat closing     Cat Dander Cough     Nasal congestion     Codeine Hives     Latex Hives      Oyster Nausea And Vomiting     Paba Forte Hives     Penicillins Hives     1950's       OBJECTIVE:   Vitals:    06/24/19 1749 06/24/19 1757   BP: 171/90 176/76   Patient Site: Right Arm    Patient Position: Sitting    Pulse: 74    Resp: 16    Temp: 98.3  F (36.8  C)    TempSrc: Oral    SpO2: 96%    Weight: 107 lb (48.5 kg)      Physical exam reveals a pleasant 78 y.o. female.   General: Appears healthy, alert and cooperative. Non-toxic appearance.  Eyes:  No conjunctivitis, lids normal.   Ears:  Normal appearing auricle. External auditory meatus without excessive cerumen, edema or erythema. normal appearance.  No mastoid tenderness. No pain with palpation over tragus.  Nose:    Mucosa normal. No rhinorrhea. No sinus tenderness.  Mouth:  Mucosa pink and moist.  normal-appearing mucosa. No trismus. No evidence of PTA. Normal phonation.  Neck: normal, supple and no adenopathy  Pulmonary/Chest: Chest is clear, no wheezing, rhonchi or rales. Symmetric air entry throughout both lung fields. Symmetrical chest wall movement.  Heart: regular rate and rhythm, no murmur, rub or gallop  Abdomen: soft, nontender. No masses or organomegaly  Neuro: Alert, oriented. Non-focal.  Skin: pink, warm, dry, and without lesions on limited skin exam. No rashes.  Psychiatric: Normal mood and affect.  Normal judgement and thought content. Normal behavior.       RADIOLOGY    No results found.    LABORATORY STUDIES    Recent Results (from the past 24 hour(s))   Urinalysis-UC if Indicated   Result Value Ref Range    Color, UA Yellow Colorless, Yellow, Straw, Light Yellow    Clarity, UA Clear Clear    Glucose, UA Negative Negative    Bilirubin, UA Negative Negative    Ketones, UA Negative Negative    Specific Gravity, UA 1.010 1.005 - 1.030    Blood, UA Large (!) Negative    pH, UA 7.0 5.0 - 8.0    Protein, UA Trace (!) Negative mg/dL    Urobilinogen, UA 0.2 E.U./dL 0.2 E.U./dL, 1.0 E.U./dL    Nitrite, UA Negative Negative    Leukocytes, UA  Moderate (!) Negative    Bacteria, UA Few (!) None Seen hpf    RBC, UA 10-25 (!) None Seen, 0-2 hpf    WBC, UA 25-50 (!) None Seen, 0-5 hpf    Squam Epithel, UA 0-5 None Seen, 0-5 lpf    WBC Clumps Present (!) None Seen     Fifi Cardona, MEND[employer] Student      I, Sri Bone, was present with the NP student who participated in the service and in the documentation of the note.  I have verified the history and personally performed the physical exam and medical decision making.  I agree with the assessment and plan of care as documented in the note.       Sri Bone, CNP

## 2021-05-30 NOTE — TELEPHONE ENCOUNTER
Patient Returning Call  Reason for call:  Patient is returning a missed call.  Patient states a voicemail was not received.  Information relayed to patient:  No information available stated to relay to the patient.  Patient has additional questions:  No  If YES, what are your questions/concerns:  N/a  Okay to leave a detailed message?: Yes   [phone]

## 2021-05-30 NOTE — TELEPHONE ENCOUNTER
Refill Approved    Rx renewed per Medication Renewal Policy. Medication was last renewed on 2/22/19.    Last office visit 7/8/19    Doc Arce, [facility] Connection Triage/Med Refill 7/30/2019     Requested Prescriptions   Pending Prescriptions Disp Refills     levothyroxine (SYNTHROID, LEVOTHROID) 100 MCG tablet [Pharmacy Med Name: LEVOTHYROXINE SODIUM 100 MCG Tablet] 90 tablet 1     Sig: TAKE 1 TABLET EVERY DAY       Thyroid Hormones Protocol Passed - 7/30/2019 12:12 AM        Passed - Provider visit in past 12 months or next 3 months     Last office visit with prescriber/PCP: 1/14/2019 Sabas Roe MD OR same dept: 7/8/2019 Macy Mendoza NP OR same specialty: 7/8/2019 Macy Mendoza NP  Last physical: Visit date not found Last MTM visit: Visit date not found   Next visit within 3 mo: Visit date not found  Next physical within 3 mo: Visit date not found  Prescriber OR PCP: Sabas Roe MD  Last diagnosis associated with med order: 1. Hypothyroidism, unspecified type  - levothyroxine (SYNTHROID, LEVOTHROID) 100 MCG tablet [Pharmacy Med Name: LEVOTHYROXINE SODIUM 100 MCG Tablet]; TAKE 1 TABLET EVERY DAY  Dispense: 90 tablet; Refill: 1    If protocol passes may refill for 12 months if within 3 months of last provider visit (or a total of 15 months).             Passed - TSH on file in past 12 months for patient age 12 & older     TSH   Date Value Ref Range Status   07/08/2019 4.57 0.30 - 5.00 uIU/mL Final

## 2021-05-30 NOTE — PROGRESS NOTES
Assessment/Plan:     1. Dysuria  UA is clear.  Reassured patient.     - Urinalysis-UC if Indicated    2. Hypothyroidism, unspecified type  Recent adjustment of Levothyroxine dose.  Will recheck labs today.  - Thyroid Cascade    3. Vaginal atrophy  Suspect vaginal irritation and dryness is secondary to postmenopausal atrophy.  This could be contributing to urinary symptoms.  Patient is requesting trial of local estrogen, and I think this is appropriate.  No contraindications to use.  Discussed proper use and possible risks/side effects.  She will apply topically once daily x 2 weeks, then decrease to 2x per week thereafter.  Follow up in 4 weeks.   - estradiol (ESTRACE) 0.01 % (0.1 mg/gram) vaginal cream; Apply daily to vaginal area for 2 weeks, then decrease to 2 times per week.  Dispense: 42.5 g; Refill: 6        Subjective:     Jaye Soto is a 78 y.o. female who presents for follow up regarding recent [facility] visit.  She was seen on 6/24 with complaints of dysuria and urinary frequency.  She was treated for UTI with Macrobid.  Her urine culture was negative, but she completed the antibiotic.  Patient states her urinary symptoms are completely resolved, but she is having some vaginal irritation.  This is not new.  Patient was also treated for UTI symptoms in May.  States her labia feel very dry and irritated.  She has tried several OTC lubricants as well as coconut oil without relief.  It is sometimes worse with urination.  Denies any vaginal bleeding or discharge.  History of hysterectomy.  Patient is requesting topical estrogen.  She has never utilized this in the past.     Patient's Levothyroxine was also increased in January - from 75 mcg to 100 mcg, as her TSH was elevated at that visit.  She is feeling good on the current dose.     The following portions of the patient's history were reviewed and updated as appropriate: allergies, current medications.    Review of Systems  A comprehensive review of systems  was performed and was otherwise negative    Objective:     /86   Pulse 64   Temp 97.8  F (36.6  C)   Wt 109 lb 8 oz (49.7 kg)   LMP  (LMP Unknown)     General Appearance: Alert, cooperative, no distress, appears stated age  Back: no CVA tenderness  Lungs: Clear to auscultation bilaterally, respirations unlabored  Heart: Regular rate and rhythm, S1 and S2 normal, no murmur, rub, or gallop   Abdomen: Soft, non-tender, bowel sounds active all four quadrants,  no masses, no organomegaly  Pelvic:Perineum: is normal  Vulva: normal  Vagina: mild irritation and atrophy    Macy Mendoza, NP

## 2021-05-30 NOTE — TELEPHONE ENCOUNTER
Patient called and message left detailing negative urine culture. States that if patient is noticing improvement to continue antibiotics as instructed. If no improvement, she should discontinue antibiotics and follow-up with her primary care provider. Gave call back phone number if any further questions.

## 2021-05-30 NOTE — TELEPHONE ENCOUNTER
Patient Returning Call  Reason for call:  Test results   Information relayed to patient:                ----- Message from Macy Mendoza NP sent at 7/8/2019  8:48 PM CDT -----  Please call patient.     TSH is normal.  Recommend staying on her current dose of Levothyroxine.     Macy Mendoza NP                  Patient has additional questions:  No  If YES, what are your questions/concerns:  NONE  Okay to leave a detailed message?: No call back needed

## 2021-05-30 NOTE — TELEPHONE ENCOUNTER
----- Message from Macy Mendoza NP sent at 7/8/2019  8:48 PM CDT -----  Please call patient.    TSH is normal.  Recommend staying on her current dose of Levothyroxine.    Macy Mendoza NP

## 2021-05-31 NOTE — PROGRESS NOTES
Assessment/Plan:     1. Vaginal atrophy  Coconut oil is currently working well for her.  Recommend using twice daily.    2. Bilateral thoracic back pain, unspecified chronicity  Suspect muscular pain.  Lungs are clear and vital are stable.  No rash/redness to indicate Shingles.  Recommend Tylenol two times a day and heat application.  Continue to take deep breaths.  Follow up with worsening pain, shortness of breath at rest, fever, or rash.         Subjective:     Jaye Soto is a 78 y.o. female who presents for medication follow up.  Patient was started on estrogen vaginal cream about 1 month ago due to recurrent dysuria and vaginal dryness.  She picked up the cream, but read all the possible side effects and never started it.  She was mostly concerns with possible effects on her eyes, as she has glaucoma.  Her eye pressures were recently elevated.  She has been using coconut oil, and this seems to be working well for her.  Denies may vaginal irritation, itching, or dysuria.  She does not wish to trial the Estrogen cream.    Patient also complains of upper back pain.  No known injury or fall.  Pain increases with deep breaths and change in movement.  She does feel mildly short of breath at times.  No fever, cough, or chest pain.  Patient walks ~4 miles per day.  No rash or redness noted.  No OTC treatments tried.       The following portions of the patient's history were reviewed and updated as appropriate: allergies, current medications    Review of Systems  A comprehensive review of systems was performed and was otherwise negative    Objective:     /80   Pulse 74   Temp 97.9  F (36.6  C)   Wt 105 lb 4.8 oz (47.8 kg)   LMP  (LMP Unknown)   SpO2 97%     General Appearance: Alert, cooperative, no distress, appears stated age  Back: Thoracic spine is straight and nontender. No rash or redness. Mild discomfort palpated along the medial aspect of the bilateral scapula.   Lungs: Clear to auscultation  bilaterally, respirations unlabored  Heart: Regular rate and rhythm, S1 and S2 normal, no murmur, rub, or gallop   Pelvic:Perineum: is normal  Vulva: normal  Vagina: Mucosa is dry and atrophic. No rash, redness, or discharge.     Macy Mendoza, NP

## 2021-06-02 VITALS — WEIGHT: 108 LBS

## 2021-06-03 VITALS — WEIGHT: 107 LBS

## 2021-06-03 VITALS — WEIGHT: 105.3 LBS

## 2021-06-03 VITALS — WEIGHT: 109.5 LBS

## 2021-06-04 VITALS — WEIGHT: 116.7 LBS | BODY MASS INDEX: 23.52 KG/M2 | HEIGHT: 59 IN

## 2021-06-04 NOTE — TELEPHONE ENCOUNTER
Refill Approved    Rx renewed per Medication Renewal Policy. Medication was last renewed on 7/30/19.    [facility] Connection Triage/Med Refill 12/19/2019     Requested Prescriptions   Pending Prescriptions Disp Refills     levothyroxine (SYNTHROID, LEVOTHROID) 100 MCG tablet 90 tablet 1     Sig: Take 1 tablet (100 mcg total) by mouth daily.       Thyroid Hormones Protocol Passed - 12/19/2019  8:21 PM        Passed - Provider visit in past 12 months or next 3 months     Last office visit with prescriber/PCP: Visit date not found OR same dept: Visit date not found OR same specialty: Visit date not found  Last physical: Visit date not found Last MTM visit: Visit date not found   Next visit within 3 mo: Visit date not found  Next physical within 3 mo: Visit date not found  Prescriber OR PCP: Shannan Grant RN  Last diagnosis associated with med order: 1. Hypothyroidism, unspecified type  - levothyroxine (SYNTHROID, LEVOTHROID) 100 MCG tablet; Take 1 tablet (100 mcg total) by mouth daily.  Dispense: 90 tablet; Refill: 1    If protocol passes may refill for 12 months if within 3 months of last provider visit (or a total of 15 months).             Passed - TSH on file in past 12 months for patient age 12 & older     TSH   Date Value Ref Range Status   07/08/2019 4.57 0.30 - 5.00 uIU/mL Final

## 2021-06-05 VITALS
HEIGHT: 60 IN | DIASTOLIC BLOOD PRESSURE: 91 MMHG | SYSTOLIC BLOOD PRESSURE: 162 MMHG | WEIGHT: 116 LBS | OXYGEN SATURATION: 97 % | HEART RATE: 77 BPM | BODY MASS INDEX: 22.78 KG/M2

## 2021-06-05 VITALS
WEIGHT: 115 LBS | SYSTOLIC BLOOD PRESSURE: 138 MMHG | HEART RATE: 80 BPM | BODY MASS INDEX: 22.46 KG/M2 | DIASTOLIC BLOOD PRESSURE: 78 MMHG

## 2021-06-07 NOTE — TELEPHONE ENCOUNTER
Refill Approved    Rx renewed per Medication Renewal Policy. Medication was last renewed on 12/19/19.    Agueda Dunn, Care Connection Triage/Med Refill 4/30/2020     Requested Prescriptions   Pending Prescriptions Disp Refills     levothyroxine (SYNTHROID, LEVOTHROID) 100 MCG tablet [Pharmacy Med Name: LEVOTHYROXINE SODIUM 100 MCG Tablet] 90 tablet 1     Sig: TAKE 1 TABLET (100 MCG TOTAL) BY MOUTH DAILY.       Thyroid Hormones Protocol Passed - 4/29/2020  4:14 PM        Passed - Provider visit in past 12 months or next 3 months     Last office visit with prescriber/PCP: 1/14/2019 Sabas Roe MD OR same dept: 8/19/2019 Macy Mendoza NP OR same specialty: 8/19/2019 Macy Mendoza NP  Last physical: Visit date not found Last MTM visit: Visit date not found   Next visit within 3 mo: Visit date not found  Next physical within 3 mo: Visit date not found  Prescriber OR PCP: Sabas Roe MD  Last diagnosis associated with med order: 1. Hypothyroidism, unspecified type  - levothyroxine (SYNTHROID, LEVOTHROID) 100 MCG tablet [Pharmacy Med Name: LEVOTHYROXINE SODIUM 100 MCG Tablet]; Take 1 tablet (100 mcg total) by mouth daily.  Dispense: 90 tablet; Refill: 1    If protocol passes may refill for 12 months if within 3 months of last provider visit (or a total of 15 months).             Passed - TSH on file in past 12 months for patient age 12 & older     TSH   Date Value Ref Range Status   07/08/2019 4.57 0.30 - 5.00 uIU/mL Final

## 2021-06-08 NOTE — TELEPHONE ENCOUNTER
Left message for patient to call back. If patient calls back, please relay message below.    Please assist in rescheduling patient's annual wellness exam after July 6. Due to COVID-19, all non-urgent appointments are being rescheduled. If patient has capabilities to do a video visit, please offer patient virtual annual wellness exam and assist in scheduling whenever patient is available.

## 2021-06-08 NOTE — PROGRESS NOTES
Code Status:  DNR  Visit Type: Problem Visit     Facility:  [facility] [[id number]]             History of Present Illness: Jaye Soto is a 79 y.o. female who I am seeing today for follow up right femoral neck fracture,on the TCU. Pt recently hospitalized on 5/27/2020.  History includes hypothyroidism.  Patient presented to the ER after a mechanical fall in which she slipped over her wheeled wardrobe and fell onto the right side.  Patient found to have a right femoral neck fracture.  Patient status post right bipolar hemiarthroplasty on 5/28/2020.  Patient had mild acute blood loss anemia postoperative with hemoglobin down to 9.7.  Asymptomatic.  Tolerated well.  She also had postop urinary retention initially and required straight cath but later resolved.      Today patient lying in bed.  Incision dry with Tegaderm dressing.  Edema decreased on the lower extremities.  She continues with BRYANT hose.  Pain well controlled with Tylenol and oxycodone.  She continues on Xarelto for DVT prophylaxis.  Mild acute blood loss anemia.  Hemoglobin 9.4.      Active Ambulatory Problems     Diagnosis Date Noted     Hypothyroidism 01/14/2019     Glaucoma 01/14/2019     Closed right hip fracture, initial encounter (H) 05/27/2020     Closed displaced fracture of right femoral neck (H) 05/28/2020     Resolved Ambulatory Problems     Diagnosis Date Noted     No Resolved Ambulatory Problems     Past Medical History:   Diagnosis Date     Disease of thyroid gland      Osteoporosis        Current Outpatient Medications   Medication Sig     cholecalciferol, vitamin D3, (VITAMIN D3) 1,000 unit capsule Take 1,000 Units by mouth daily.     cyanocobalamin 100 MCG tablet Take 100 mcg by mouth daily.     latanoprost (XALATAN) 0.005 % ophthalmic solution Administer 1 drop to both eyes at bedtime.      levothyroxine (SYNTHROID, LEVOTHROID) 100 MCG tablet TAKE 1 TABLET (100 MCG TOTAL) BY MOUTH DAILY.     LUTEIN  EXTRACT-ZEAXANTHIN EXT ORAL Take 1 tablet by mouth daily.      lysine 500 mg Tab Take 500 mg by mouth daily.      oxyCODONE (ROXICODONE) 5 MG immediate release tablet Take 0.5 tablets (2.5 mg total) by mouth every 4 (four) hours as needed.     polyethylene glycol (MIRALAX) 17 gram packet Take 1 packet (17 g total) by mouth daily.     rivaroxaban ANTICOAGULANT (XARELTO) 10 mg tablet Take 1 tablet (10 mg total) by mouth daily.     timolol maleate (TIMOPTIC) 0.5 % ophthalmic solution Administer 1 drop to both eyes 2 (two) times a day. Am and afternoon     turmeric/turmeric ext/pepr ext (TURMERIC-TURMERIC EXT-PEPPER) 500-3 mg cap Take 1 capsule by mouth daily.      ubidecarenone/vitamin E mixed (COQ10  ORAL) Take 1 tablet by mouth daily.        Allergies   Allergen Reactions     Ceftin [Cefuroxime Axetil] Anaphylaxis     Tongue swelling     Dorzolamide Shortness Of Breath and Itching     Short of breath,      Naprosyn [Naproxen] Anaphylaxis     Tongue swelling, throat closing     Cat Dander Cough     Nasal congestion     Codeine Hives     Latex Hives     Oyster Nausea And Vomiting     Paba Forte Hives     Penicillins Hives     1950's         Review of Systems  No fevers or chills. No headache, lightheadedness or dizziness. No SOB, chest pains or palpitations. Appetite is good. No nausea, vomiting, constipation or diarrhea. No dysuria, frequency, burning or pain with urination.  Pain well controlled in right hip with Tylenol and oxycodone.  Otherwise review of systems are negative.     Physical Exam  PHYSICAL EXAMINATION:  Vital signs: /68, pulse 75, respirations 18, temperature 99.1, O2 sat 92% on room air.  Weight 122.2 pounds.  General: Awake, Alert, oriented x3, appropriately, follows simple commands, conversant  HEENT:Pink conjunctiva, anicteric sclerae, moist oral mucosa  NECK: Supple  CVS:  S1  S2, without murmur or gallop.   LUNG: Clear to auscultation, No wheezes, rales or rhonci.  BACK: No kyphosis  of the thoracic spine  ABDOMEN: Soft, nontender to palpation, with positive bowel sounds  EXTREMITIES: Movesboth upper and lower extremities with generalized weakness , 1+ pedal edema, no calf tenderness BRYANT hose on.   SKIN: Warm and dry, no rashes or erythema noted.  Incision to right hip intact with Tegaderm dressing.  NEUROLOGIC: Intact, pulses palpable  PSYCHIATRIC: Cognition intact pleasant affect.       Labs:   Lab Results   Component Value Date    WBC 10.3 05/28/2020    HGB 9.4 (L) 06/02/2020    HCT 37.2 05/28/2020    MCV 94 05/28/2020     05/28/2020     Results for orders placed or performed during the hospital encounter of 05/27/20   Basic Metabolic Panel   Result Value Ref Range    Sodium 138 136 - 145 mmol/L    Potassium 4.2 3.5 - 5.0 mmol/L    Chloride 104 98 - 107 mmol/L    CO2 27 22 - 31 mmol/L    Anion Gap, Calculation 7 5 - 18 mmol/L    Glucose 105 70 - 125 mg/dL    Calcium 8.4 (L) 8.5 - 10.5 mg/dL    BUN 6 (L) 8 - 28 mg/dL    Creatinine 0.59 (L) 0.60 - 1.10 mg/dL    GFR MDRD Af Amer >60 >60 mL/min/1.73m2    GFR MDRD Non Af Amer >60 >60 mL/min/1.73m2           Assessment/Plan:  1. Closed displaced fracture of right femoral neck (H)   patient TTWB.    2. History of right hip hemiarthroplasty   continue Xarelto for DVT prophylactics.  Lower extremity edema improving.    Continue BRYANT hose.  On the a.m. off at at bedtime.     3. Acute blood loss anemia   hemoglobin today 9.4.  Follow-up hemoglobin on Thursday.   4. Pain management   pain well controlled with Tylenol and oxycodone.           Electronically signed by: Cintia Cabrera, ESTHER

## 2021-06-08 NOTE — ANESTHESIA PREPROCEDURE EVALUATION
Anesthesia Evaluation      Patient summary reviewed   No history of anesthetic complications     Airway   Mallampati: I  Neck ROM: full   Pulmonary - negative ROS and normal exam                          Cardiovascular - negative ROS and normal exam  (-) murmur  ECG reviewed  Rhythm: regular  Rate: normal,    no murmur      Neuro/Psych - negative ROS     Endo/Other    (+) hypothyroidism,      GI/Hepatic/Renal - negative ROS           Dental - normal exam                        Anesthesia Plan  Planned anesthetic: spinal    ASA 1     Anesthetic plan and risks discussed with: patient    Post-op plan: routine recovery

## 2021-06-08 NOTE — PROGRESS NOTES
Code Status:  DNR  Visit Type: Discharge Summary     Facility:  [facility] [[id number]]             History of Present Illness: Jaye Soto is a 79 y.o. female who I am seeing today for discharge from the TCU. Pt recently hospitalized on 5/27/2020.  History includes hypothyroidism.  Patient presented to the ER after a mechanical fall in which she slipped over her wheeled wardrobe and fell onto the right side.  Patient found to have a right femoral neck fracture.  Patient status post right bipolar hemiarthroplasty on 5/28/2020.  Patient had mild acute blood loss anemia postoperative with hemoglobin down to 9.7.  Asymptomatic.  Tolerated well.  She also had postop urinary retention initially and required straight cath but later resolved.      Today patient lying in bed.  Patient with recent right femoral neck fracture with ORIF.  Pain well controlled with Tylenol and oxycodone.  Patient continues to be toe-touch weightbearing.  She has a follow-up with Ortho on Thursday.  She continues on Xarelto for DVT prophylaxis.  Acute blood loss anemia.  Hemoglobin now 11.3 up from 9.7.  Constipation.  Relieved with prune juice.  Postoperative urinary retention.  This has resolved.  Voiding adequately.      Active Ambulatory Problems     Diagnosis Date Noted     Hypothyroidism 01/14/2019     Glaucoma 01/14/2019     Closed right hip fracture, initial encounter (H) 05/27/2020     Closed displaced fracture of right femoral neck (H) 05/28/2020     Resolved Ambulatory Problems     Diagnosis Date Noted     No Resolved Ambulatory Problems     Past Medical History:   Diagnosis Date     Disease of thyroid gland      Osteoporosis        Current Outpatient Medications   Medication Sig     cholecalciferol, vitamin D3, (VITAMIN D3) 1,000 unit capsule Take 1,000 Units by mouth daily.     cyanocobalamin 100 MCG tablet Take 100 mcg by mouth daily.     latanoprost (XALATAN) 0.005 % ophthalmic solution Administer 1 drop to  both eyes at bedtime.      levothyroxine (SYNTHROID, LEVOTHROID) 100 MCG tablet TAKE 1 TABLET (100 MCG TOTAL) BY MOUTH DAILY.     LUTEIN EXTRACT-ZEAXANTHIN EXT ORAL Take 1 tablet by mouth daily.      lysine 500 mg Tab Take 500 mg by mouth daily.      oxyCODONE (ROXICODONE) 5 MG immediate release tablet Take 0.5 tablets (2.5 mg total) by mouth every 4 (four) hours as needed.     polyethylene glycol (MIRALAX) 17 gram packet Take 1 packet (17 g total) by mouth daily.     rivaroxaban ANTICOAGULANT (XARELTO) 10 mg tablet Take 1 tablet (10 mg total) by mouth daily.     timolol maleate (TIMOPTIC) 0.5 % ophthalmic solution Administer 1 drop to both eyes 2 (two) times a day. Am and afternoon     turmeric/turmeric ext/pepr ext (TURMERIC-TURMERIC EXT-PEPPER) 500-3 mg cap Take 1 capsule by mouth daily.      ubidecarenone/vitamin E mixed (COQ10  ORAL) Take 1 tablet by mouth daily.        Allergies   Allergen Reactions     Ceftin [Cefuroxime Axetil] Anaphylaxis     Tongue swelling     Dorzolamide Shortness Of Breath and Itching     Short of breath,      Naprosyn [Naproxen] Anaphylaxis     Tongue swelling, throat closing     Cat Dander Cough     Nasal congestion     Codeine Hives     Latex Hives     Oyster Nausea And Vomiting     Paba Forte Hives     Penicillins Hives     1950's         Review of Systems  No fevers or chills. No headache, lightheadedness or dizziness. No SOB, chest pains or palpitations. Appetite is good. No nausea, vomiting, constipation or diarrhea. No dysuria, frequency, burning or pain with urination.  Pain well controlled in right hip with Tylenol and occasional oxycodone.  Otherwise review of systems are negative.     Physical Exam  PHYSICAL EXAMINATION:  Vital signs: /74, pulse 78, respirations 16, temperature 98.8, O2 sat 96% on room air.  Weight 122.2 pounds.  General: Awake, Alert, oriented x3, appropriately, follows simple commands, conversant  HEENT:Pink conjunctiva, anicteric sclerae,  moist oral mucosa  NECK: Supple  CVS:  S1  S2, without murmur or gallop.   LUNG: Clear to auscultation, No wheezes, rales or rhonci.  BACK: No kyphosis of the thoracic spine  ABDOMEN: Soft, nontender to palpation, with positive bowel sounds  EXTREMITIES: Movesboth upper and lower extremities with generalized weakness , no pedal edema, no calf tenderness BRYANT hose on.   SKIN: Warm and dry, no rashes or erythema noted.  Incision to right hip intact with dry sterile dressing.   NEUROLOGIC: Intact, pulses palpable  PSYCHIATRIC: Cognition intact pleasant affect.       Labs:   Lab Results   Component Value Date    WBC 10.3 05/28/2020    HGB 11.3 (L) 06/15/2020    HCT 37.2 05/28/2020    MCV 94 05/28/2020     05/28/2020     Results for orders placed or performed during the hospital encounter of 05/27/20   Basic Metabolic Panel   Result Value Ref Range    Sodium 138 136 - 145 mmol/L    Potassium 4.2 3.5 - 5.0 mmol/L    Chloride 104 98 - 107 mmol/L    CO2 27 22 - 31 mmol/L    Anion Gap, Calculation 7 5 - 18 mmol/L    Glucose 105 70 - 125 mg/dL    Calcium 8.4 (L) 8.5 - 10.5 mg/dL    BUN 6 (L) 8 - 28 mg/dL    Creatinine 0.59 (L) 0.60 - 1.10 mg/dL    GFR MDRD Af Amer >60 >60 mL/min/1.73m2    GFR MDRD Non Af Amer >60 >60 mL/min/1.73m2           Assessment/Plan:  1. Closed displaced fracture of right femoral neck (H)   patient TTWB.  Follow-up with Ortho on Thursday.   2. History of right hip hemiarthroplasty   continue Xarelto for DVT prophylactics.  Ortho to discontinue.  Lower extremity edema improving.    Continue BRYANT hose.  On the a.m. off at at bedtime.     3. Acute blood loss anemia   hemoglobin now 11.3 up from 9.7.     4. Pain management   pain well controlled with Tylenol and oxycodone.  Patient using oxycodone 1-2 times daily.  Will attempt to wean off at home.   5.  Dry eyes  Natural tears 1-2 gtts four times a day prn .     Okay to DC home with current meds, treatments and narcotics.  Home PT, OT, home health  aide and RN.  Patient reports she would not like to start home care services right away until she has her follow-up on Thursday.  Follow-up with primary care provider in 1 week.    DISCHARGE PLAN/FACE TO FACE:  I certify that this patient is under my care and that I, or a nurse practitioner or physician's assistant working with me, had a face-to-face encounter that meets the physician face-to-face encounter requirements with this patient.       I certify that, based on my findings, the following services are medically necessary home health services.    My clinical findings support the need for the above skilled services.    This patient is homebound because:   Status post femoral neck fracture with ORIF.    The patient is, or has been, under my care and I have initiated the establishment of the plan of care. This patient will be followed by a physician who will periodically review the plan of care.    Total time spent this visit was 35 minutes with greater than 50% of the time spent face-to-face with patient reviewing discharge medications including pain management and attempting to wean off narcotics at home.  Follow-up with primary care provider and Ortho.  Reviewed weightbearing status as well as equipment for home.  Patient will go home with wheelchair.    Electronically signed by: Cintia Cabrera CNP

## 2021-06-08 NOTE — PROGRESS NOTES
Code Status:  DNR  Visit Type: Problem Visit     Facility:  [facility] [[id number]]             History of Present Illness: Jaye Soto is a 79 y.o. female who I am seeing today for follow up right femoral neck fracture,on the TCU. Pt recently hospitalized on 5/27/2020.  History includes hypothyroidism.  Patient presented to the ER after a mechanical fall in which she slipped over her wheeled wardrobe and fell onto the right side.  Patient found to have a right femoral neck fracture.  Patient status post right bipolar hemiarthroplasty on 5/28/2020.  Patient had mild acute blood loss anemia postoperative with hemoglobin down to 9.7.  Asymptomatic.  Tolerated well.  She also had postop urinary retention initially and required straight cath but later resolved.      Today patient lying in bed.  Patient with recent right femoral neck fracture with ORIF.  Patient continues on Tylenol and oxycodone for pain.  She is moving quite well.  She continues on Xarelto for DVT prophylactics.  Incision dry and intact with Tegaderm.  Edema has greatly resolved.  She continues on BRYANT hose.  Acute blood loss anemia.  Hemoglobin stable at 9.4.  Patient was given prune juice last evening and had 2 bowel movement today.  Patient complaining of dry eyes.    Active Ambulatory Problems     Diagnosis Date Noted     Hypothyroidism 01/14/2019     Glaucoma 01/14/2019     Closed right hip fracture, initial encounter (H) 05/27/2020     Closed displaced fracture of right femoral neck (H) 05/28/2020     Resolved Ambulatory Problems     Diagnosis Date Noted     No Resolved Ambulatory Problems     Past Medical History:   Diagnosis Date     Disease of thyroid gland      Osteoporosis        Current Outpatient Medications   Medication Sig     cholecalciferol, vitamin D3, (VITAMIN D3) 1,000 unit capsule Take 1,000 Units by mouth daily.     cyanocobalamin 100 MCG tablet Take 100 mcg by mouth daily.     latanoprost (XALATAN) 0.005 %  ophthalmic solution Administer 1 drop to both eyes at bedtime.      levothyroxine (SYNTHROID, LEVOTHROID) 100 MCG tablet TAKE 1 TABLET (100 MCG TOTAL) BY MOUTH DAILY.     LUTEIN EXTRACT-ZEAXANTHIN EXT ORAL Take 1 tablet by mouth daily.      lysine 500 mg Tab Take 500 mg by mouth daily.      oxyCODONE (ROXICODONE) 5 MG immediate release tablet Take 0.5 tablets (2.5 mg total) by mouth every 4 (four) hours as needed.     polyethylene glycol (MIRALAX) 17 gram packet Take 1 packet (17 g total) by mouth daily.     rivaroxaban ANTICOAGULANT (XARELTO) 10 mg tablet Take 1 tablet (10 mg total) by mouth daily.     timolol maleate (TIMOPTIC) 0.5 % ophthalmic solution Administer 1 drop to both eyes 2 (two) times a day. Am and afternoon     turmeric/turmeric ext/pepr ext (TURMERIC-TURMERIC EXT-PEPPER) 500-3 mg cap Take 1 capsule by mouth daily.      ubidecarenone/vitamin E mixed (COQ10  ORAL) Take 1 tablet by mouth daily.        Allergies   Allergen Reactions     Ceftin [Cefuroxime Axetil] Anaphylaxis     Tongue swelling     Dorzolamide Shortness Of Breath and Itching     Short of breath,      Naprosyn [Naproxen] Anaphylaxis     Tongue swelling, throat closing     Cat Dander Cough     Nasal congestion     Codeine Hives     Latex Hives     Oyster Nausea And Vomiting     Paba Forte Hives     Penicillins Hives     1950's         Review of Systems  No fevers or chills. No headache, lightheadedness or dizziness. No SOB, chest pains or palpitations. Appetite is good. No nausea, vomiting, constipation or diarrhea. No dysuria, frequency, burning or pain with urination.  Pain well controlled in right hip with Tylenol and oxycodone.  Otherwise review of systems are negative.     Physical Exam  PHYSICAL EXAMINATION:  Vital signs: /73, pulse 79, respirations 16, temperature 98.1, O2 sat 98% on room air.  Weight 122.2 pounds.  General: Awake, Alert, oriented x3, appropriately, follows simple commands, conversant  HEENT:Pink  conjunctiva, anicteric sclerae, moist oral mucosa  NECK: Supple  CVS:  S1  S2, without murmur or gallop.   LUNG: Clear to auscultation, No wheezes, rales or rhonci.  BACK: No kyphosis of the thoracic spine  ABDOMEN: Soft, nontender to palpation, with positive bowel sounds  EXTREMITIES: Movesboth upper and lower extremities with generalized weakness , trace pedal edema, no calf tenderness BRYANT hose on.   SKIN: Warm and dry, no rashes or erythema noted.  Incision to right hip intact staples.  No redness or warmth.  No drainage.  NEUROLOGIC: Intact, pulses palpable  PSYCHIATRIC: Cognition intact pleasant affect.       Labs:   Lab Results   Component Value Date    WBC 10.3 05/28/2020    HGB 9.4 (L) 06/02/2020    HCT 37.2 05/28/2020    MCV 94 05/28/2020     05/28/2020     Results for orders placed or performed during the hospital encounter of 05/27/20   Basic Metabolic Panel   Result Value Ref Range    Sodium 138 136 - 145 mmol/L    Potassium 4.2 3.5 - 5.0 mmol/L    Chloride 104 98 - 107 mmol/L    CO2 27 22 - 31 mmol/L    Anion Gap, Calculation 7 5 - 18 mmol/L    Glucose 105 70 - 125 mg/dL    Calcium 8.4 (L) 8.5 - 10.5 mg/dL    BUN 6 (L) 8 - 28 mg/dL    Creatinine 0.59 (L) 0.60 - 1.10 mg/dL    GFR MDRD Af Amer >60 >60 mL/min/1.73m2    GFR MDRD Non Af Amer >60 >60 mL/min/1.73m2           Assessment/Plan:  1. Closed displaced fracture of right femoral neck (H)   patient TTWB.    2. History of right hip hemiarthroplasty   continue Xarelto for DVT prophylactics.  Lower extremity edema improving.    Continue BRYANT hose.  On the a.m. off at at bedtime.     3. Acute blood loss anemia   hemoglobin today 9.4.  Follow-up hemoglobin on Monday.    4. Pain management   pain well controlled with Tylenol and oxycodone.   5.  Dry eyes  Natural tears 1-2 gtts four times a day prn .           Electronically signed by: Cintia Cabrera, ESTHER

## 2021-06-08 NOTE — ANESTHESIA PROCEDURE NOTES
Spinal Block    Patient location during procedure: OR  Start time: 5/28/2020 7:42 PM  End time: 5/28/2020 7:46 PM  Reason for block: labor epidural    Staffing:  Performing  Anesthesiologist: Troy Fan MD    Preanesthetic Checklist  Completed: patient identified, risks, benefits, and alternatives discussed, timeout performed, consent obtained, airway assessed, oxygen available, suction available, emergency drugs available and hand hygiene performed  Spinal Block  Patient position: left lateral decubitus  Prep: Betadine  Patient monitoring: heart rate, cardiac monitor and continuous pulse ox  Approach: left paramedian  Location: L2-3  Injection technique: single-shot  Needle type: pencil-tip   Needle gauge: 24 G    Assessment  Sensory level: T8

## 2021-06-08 NOTE — PROGRESS NOTES
[facility]      Facility:    [facility] [[id number]]  Code Status: UNKNOWN      Chief Complaint/Reason for Visit:  Chief Complaint   Patient presents with     H & P     Right femoral neck fracture with status post bipolar hemiarthroplasty, postoperative urinary retention that did resolve, acute blood loss anemia with hemoglobin declining.       HPI:   Jaye is a 79 y.o. female who was recently admitted to the hospital 5/27/2020 after a fall she sustained at home.  It was a mechanical fall with no signs of dizziness syncope or near syncope and she was then admitted to the hospital with a right femoral neck fracture.  She was seen by orthopedic surgery and underwent a bipolar hemiarthroplasty on 528 and there were no real complications of acute blood loss anemia but there is no report of any blood transfusions.  Hemoglobin down to 9.7 she was asymptomatic and she did have postoperative urinary intention however required straight cathing but this did resolve on discharge.  She claims she is urinating okay.    Patient's pain is well controlled she did a bowel movement yesterday.  She denies any other issues at this time and claims she is progressing as anticipated physical and Occupational Therapy.  Patient was seen from the door with social distancing I did auscultate the lungs and heart were in appropriate PPE at this time and there were no real issues.  Staff have no new concerns.    Past Medical History:  Past Medical History:   Diagnosis Date     Disease of thyroid gland     Hypothyroidism      Glaucoma     Follows w/ Dr. Nidia Denton in [place], [place]      Osteoporosis     Cervical and Lumbar spine injuries           Surgical History:  Past Surgical History:   Procedure Laterality Date     ABDOMINOPLASTY       EYE SURGERY Bilateral     for glaucoma     HYSTERECTOMY      Prolapsed uterus, ovaries in     MENISCECTOMY Right      VA PARTIAL HIP REPLACEMENT Right 5/28/2020     Procedure: HEMIARTHROPLASTY, HIP, BIPOLAR;  Surgeon: Alix Pichardo MD;  Location: [facility] OR;  Service: Orthopedics     TONSILLECTOMY         Family History:   Family History   Problem Relation Age of Onset     Glaucoma Father        Social History:    Social History     Socioeconomic History     Marital status:      Spouse name: None     Number of children: 2     Years of education: None     Highest education level: None   Occupational History     Occupation: RN   Social Needs     Financial resource strain: None     Food insecurity     Worry: None     Inability: None     Transportation needs     Medical: None     Non-medical: None   Tobacco Use     Smoking status: Never Smoker     Smokeless tobacco: Never Used   Substance and Sexual Activity     Alcohol use: No     Frequency: Never     Drug use: No     Sexual activity: Not Currently   Lifestyle     Physical activity     Days per week: None     Minutes per session: None     Stress: None   Relationships     Social connections     Talks on phone: None     Gets together: None     Attends Zoroastrianism service: None     Active member of club or organization: None     Attends meetings of clubs or organizations: None     Relationship status: None     Intimate partner violence     Fear of current or ex partner: None     Emotionally abused: None     Physically abused: None     Forced sexual activity: None   Other Topics Concern     None   Social History Narrative    Diet-        Exercise-        Born [place], just moved from [place]          Review of Systems   Constitutional:        Patient claims her pain is under adequate control denies any fever chills nausea vomit diarrhea change in vision hearing taste or smell weakness one-sided chest pain shortness of breath.  There is no congeners stool polyphagia polydipsia polyuria depression or anxiety and remainder the review of systems is negative.       Vitals:    06/01/20 1107   BP: 111/66   Pulse: 89   Resp: 18    Temp: 97.9  F (36.6  C)   SpO2: 95%       Physical Exam  Constitutional:       General: She is not in acute distress.     Appearance: Normal appearance. She is not toxic-appearing.   HENT:      Head: Normocephalic and atraumatic.      Mouth/Throat:      Mouth: Mucous membranes are moist.      Pharynx: Oropharynx is clear.   Cardiovascular:      Rate and Rhythm: Normal rate and regular rhythm.   Pulmonary:      Effort: Pulmonary effort is normal. No respiratory distress.      Breath sounds: No wheezing or rales.   Abdominal:      General: Abdomen is flat.      Palpations: Abdomen is soft.   Musculoskeletal:      Right lower leg: No edema.      Left lower leg: No edema.   Skin:     General: Skin is warm and dry.   Neurological:      Mental Status: Mental status is at baseline.   Psychiatric:         Mood and Affect: Mood normal.         Behavior: Behavior normal.         Medication List:  Current Outpatient Medications   Medication Sig     cholecalciferol, vitamin D3, (VITAMIN D3) 1,000 unit capsule Take 1,000 Units by mouth daily.     cyanocobalamin 100 MCG tablet Take 100 mcg by mouth daily.     latanoprost (XALATAN) 0.005 % ophthalmic solution Administer 1 drop to both eyes at bedtime.      levothyroxine (SYNTHROID, LEVOTHROID) 100 MCG tablet TAKE 1 TABLET (100 MCG TOTAL) BY MOUTH DAILY.     LUTEIN EXTRACT-ZEAXANTHIN EXT ORAL Take 1 tablet by mouth daily.      lysine 500 mg Tab Take 500 mg by mouth daily.      oxyCODONE (ROXICODONE) 5 MG immediate release tablet Take 0.5 tablets (2.5 mg total) by mouth every 4 (four) hours as needed.     polyethylene glycol (MIRALAX) 17 gram packet Take 1 packet (17 g total) by mouth daily.     rivaroxaban ANTICOAGULANT (XARELTO) 10 mg tablet Take 1 tablet (10 mg total) by mouth daily.     timolol maleate (TIMOPTIC) 0.5 % ophthalmic solution Administer 1 drop to both eyes 2 (two) times a day. Am and afternoon     turmeric/turmeric ext/pepr ext (TURMERIC-TURMERIC EXT-PEPPER) 500-3  mg cap Take 1 capsule by mouth daily.      ubidecarenone/vitamin E mixed (COQ10  ORAL) Take 1 tablet by mouth daily.        Labs: Hospital labs are as follows; hemoglobin went from 11.8-9.7 in the hospital.  Also conference of metabolic profile was basically within normal limits with the exception of a low calcium at 7.8 and bilirubin was 1.6 alk phosphatase ALT, AST were all within normal limits.      Assessment:    ICD-10-CM    1. Closed displaced fracture of right femoral neck (H)  S72.001A    2. History of right hip hemiarthroplasty  Z96.641    3. Pain management  R52    4. Acute blood loss anemia  D62        Plan: Plan at this time will check bilirubin at this time because of elevated in the hospital.  Is likely not a serious liver condition at this time but will continue to monitor.  Pain is well controlled so no new changes and will check a hemoglobin tomorrow.  We will continue to monitor above medical problems no other changes to care plan at this time.  Care plan was reviewed and is appropriate.        Electronically signed by: Troy Butler DO

## 2021-06-08 NOTE — ANESTHESIA POSTPROCEDURE EVALUATION
Patient: Jaye Soto  Procedure(s):  HEMIARTHROPLASTY, HIP, BIPOLAR (Right)  Anesthesia type: spinal    Patient location: PACU  Last vitals:   Vitals Value Taken Time   /61 5/28/2020 10:50 PM   Temp 37.3  C (99.1  F) 5/28/2020 10:30 PM   Pulse 68 5/28/2020 10:53 PM   Resp 13 5/28/2020 10:53 PM   SpO2 100 % 5/28/2020 10:53 PM   Vitals shown include unvalidated device data.  Post vital signs: stable  Level of consciousness: awake and responds to simple questions  Post-anesthesia pain: pain controlled  Post-anesthesia nausea and vomiting: no  Pulmonary: unassisted, return to baseline  Cardiovascular: stable and blood pressure at baseline  Hydration: adequate  Anesthetic events: no    QCDR Measures:  ASA# 11 - Mela-op Cardiac Arrest: ASA11B - Patient did NOT experience unanticipated cardiac arrest  ASA# 12 - Mela-op Mortality Rate: ASA12B - Patient did NOT die  ASA# 13 - PACU Re-Intubation Rate: NA - No ETT / LMA used for case  ASA# 10 - Composite Anes Safety: ASA10A - No serious adverse event    Additional Notes:

## 2021-06-08 NOTE — PROGRESS NOTES
Code Status:  DNR  Visit Type: Problem Visit     Facility:  [facility] [[id number]]             History of Present Illness: Jaye Soto is a 79 y.o. female who I am seeing today for follow up on the TCU. Pt recently hospitalized on 5/27/2020.  History includes hypothyroidism.  Patient presented to the ER after a mechanical fall in which she slipped over her wheeled wardrobe and fell onto the right side.  Patient found to have a right femoral neck fracture.  Patient status post right bipolar hemiarthroplasty on 5/28/2020.  Patient had mild acute blood loss anemia postoperative with hemoglobin down to 9.7.  Asymptomatic.  Tolerated well.  She also had postop urinary retention initially and required straight cath but later resolved.      Today patient lying in bed.  Patient is moving quite well.  Pain well controlled with Tylenol and Roxicodone.  Her bowels are moving regularly.  She continues on DVT prophylaxis with Xarelto.  She does have some lower extremity edema secondary to fluids given during hospitalization.  Patient continues in BRYANT hose.  COVID-19 negative.  Patient denies any shortness of breath, chest pain, sore throat fever or chills.          Active Ambulatory Problems     Diagnosis Date Noted     Hypothyroidism 01/14/2019     Glaucoma 01/14/2019     Closed right hip fracture, initial encounter (H) 05/27/2020     Closed displaced fracture of right femoral neck (H) 05/28/2020     Resolved Ambulatory Problems     Diagnosis Date Noted     No Resolved Ambulatory Problems     Past Medical History:   Diagnosis Date     Disease of thyroid gland      Osteoporosis        Current Outpatient Medications   Medication Sig     cholecalciferol, vitamin D3, (VITAMIN D3) 1,000 unit capsule Take 1,000 Units by mouth daily.     cyanocobalamin 100 MCG tablet Take 100 mcg by mouth daily.     latanoprost (XALATAN) 0.005 % ophthalmic solution Administer 1 drop to both eyes at bedtime.      levothyroxine  (SYNTHROID, LEVOTHROID) 100 MCG tablet TAKE 1 TABLET (100 MCG TOTAL) BY MOUTH DAILY.     LUTEIN EXTRACT-ZEAXANTHIN EXT ORAL Take 1 tablet by mouth daily.      lysine 500 mg Tab Take 500 mg by mouth daily.      oxyCODONE (ROXICODONE) 5 MG immediate release tablet Take 0.5 tablets (2.5 mg total) by mouth every 4 (four) hours as needed.     polyethylene glycol (MIRALAX) 17 gram packet Take 1 packet (17 g total) by mouth daily.     rivaroxaban ANTICOAGULANT (XARELTO) 10 mg tablet Take 1 tablet (10 mg total) by mouth daily.     timolol maleate (TIMOPTIC) 0.5 % ophthalmic solution Administer 1 drop to both eyes 2 (two) times a day. Am and afternoon     turmeric/turmeric ext/pepr ext (TURMERIC-TURMERIC EXT-PEPPER) 500-3 mg cap Take 1 capsule by mouth daily.      ubidecarenone/vitamin E mixed (COQ10  ORAL) Take 1 tablet by mouth daily.        Allergies   Allergen Reactions     Ceftin [Cefuroxime Axetil] Anaphylaxis     Tongue swelling     Dorzolamide Shortness Of Breath and Itching     Short of breath,      Naprosyn [Naproxen] Anaphylaxis     Tongue swelling, throat closing     Cat Dander Cough     Nasal congestion     Codeine Hives     Latex Hives     Oyster Nausea And Vomiting     Paba Forte Hives     Penicillins Hives     1950's         Review of Systems  No fevers or chills. No headache, lightheadedness or dizziness. No SOB, chest pains or palpitations. Appetite is good. No nausea, vomiting, constipation or diarrhea. No dysuria, frequency, burning or pain with urination.  Pain well controlled in right hip with Tylenol and oxycodone.  Otherwise review of systems are negative.     Physical Exam  PHYSICAL EXAMINATION:  Vital signs: /74, pulse 77, respirations 18, temperature 98.2, O2 sat 98% on room air.  Weight 122.2 pounds.  General: Awake, Alert, oriented x3, appropriately, follows simple commands, conversant  HEENT:PERRLA, Pink conjunctiva, anicteric sclerae, moist oral mucosa  NECK: Supple, without any  lymphadenopathy, or masses  CVS:  S1  S2, without murmur or gallop.   LUNG: Clear to auscultation, No wheezes, rales or rhonci.  BACK: No kyphosis of the thoracic spine  ABDOMEN: Soft, nontender to palpation, with positive bowel sounds  EXTREMITIES: Movesboth upper and lower extremities with generalized weakness , 2+ pedal edema, no calf tenderness BRYANT hose on.   SKIN: Warm and dry, no rashes or erythema noted.  Incision to right hip intact with Tegaderm dressing.  NEUROLOGIC: Intact, pulses palpable  PSYCHIATRIC: Cognition intact pleasant affect.       Labs:   Lab Results   Component Value Date    WBC 10.3 05/28/2020    HGB 9.7 (L) 05/30/2020    HCT 37.2 05/28/2020    MCV 94 05/28/2020     05/28/2020     Results for orders placed or performed during the hospital encounter of 05/27/20   Basic Metabolic Panel   Result Value Ref Range    Sodium 138 136 - 145 mmol/L    Potassium 4.2 3.5 - 5.0 mmol/L    Chloride 104 98 - 107 mmol/L    CO2 27 22 - 31 mmol/L    Anion Gap, Calculation 7 5 - 18 mmol/L    Glucose 105 70 - 125 mg/dL    Calcium 8.4 (L) 8.5 - 10.5 mg/dL    BUN 6 (L) 8 - 28 mg/dL    Creatinine 0.59 (L) 0.60 - 1.10 mg/dL    GFR MDRD Af Amer >60 >60 mL/min/1.73m2    GFR MDRD Non Af Amer >60 >60 mL/min/1.73m2           Assessment/Plan:  1. Closed displaced fracture of right femoral neck (H)   patient weightbearing as tolerated.   2. History of right hip hemiarthroplasty   continue Xarelto for DVT prophylactics.  Lower extremity edema.  Continue BRYANT hose.  On the a.m. off at at bedtime.     3. Acute blood loss anemia   hemoglobin today 9.7.  Follow-up hemoglobin in 1 week.   4. Pain management   pain well controlled with Tylenol and oxycodone.         35 minutes spent of which greater than 50% was face to face interviewing patient, nursing staff and therapy.    Electronically signed by: Cintia Cabrera, ESTHER

## 2021-06-08 NOTE — ANESTHESIA CARE TRANSFER NOTE
Last vitals:   Vitals:    05/28/20 2230   BP: 104/55   Pulse: 72   Resp: 16   Temp: 37.3  C (99.1  F)   SpO2: 100%     Patient's level of consciousness is drowsy  Spontaneous respirations: yes  Maintains airway independently: yes  Dentition unchanged: yes  Oropharynx: oropharynx clear of all foreign objects    QCDR Measures:  ASA# 20 - Surgical Safety Checklist: WHO surgical safety checklist completed prior to induction    PQRS# 430 - Adult PONV Prevention: 4558F - Pt received => 2 anti-emetic agents (different classes) preop & intraop  ASA# 8 - Peds PONV Prevention: NA - Not pediatric patient, not GA or 2 or more risk factors NOT present  PQRS# 424 - Mela-op Temp Management: 4559F - At least one body temp DOCUMENTED => 35.5C or 95.9F within required timeframe  PQRS# 426 - PACU Transfer Protocol: - Transfer of care checklist used  ASA# 14 - Acute Post-op Pain: ASA14B - Patient did NOT experience pain >= 7 out of 10

## 2021-06-09 NOTE — PROGRESS NOTES
[facility] Patient Outreach:     Discharge Date::  6/17/20      Reason for TCU stay (discharge diagnosis)::  Fall with right femur fx, acute blood loss anemia      Are you feeling better, the same or worse since your discharge?:  Patient is feeling better          As part of your discharge plan, did they discuss home care with you?: Yes (patient declined home care.  )        Have your seen them yet, or are they scheduled to visit?: No        Did you receive any new medications, or was there a change to your medications?: No (same meds as TCU.  )            Do you have any follow up visits scheduled with your PCP or Specialist?:  Yes, with Specialist      Who are you seeing and when is it scheduled?:  Saw ortho on 6/18/20.        I'm glad to hear you're doing well and we want you to continue to do well. Your PCP would like to see you for a follow-up visit. Can we help set that up for your today?: No (Patient has an appt on with PCP clinic on 7/14/20.  )        (RN) Provided patient the PCP's phone number to call if they have any questions or concerns?: No

## 2021-06-10 NOTE — TELEPHONE ENCOUNTER
RN cannot approve Refill Request    RN can NOT refill this medication Protocol failed and NO refill given. Last office visit: 1/14/2019 Sabas Roe MD Last Physical: Visit date not found Last MTM visit: Visit date not found Last visit same specialty: 8/19/2019 Macy Mendoza NP.  Next visit within 3 mo: Visit date not found  Next physical within 3 mo: Visit date not found      Agueda Dunn, [facility] Triage/Med Refill 8/13/2020    Requested Prescriptions   Pending Prescriptions Disp Refills     levothyroxine (SYNTHROID, LEVOTHROID) 100 MCG tablet [Pharmacy Med Name: LEVOTHYROXINE SODIUM 100 MCG Tablet] 90 tablet 0     Sig: TAKE 1 TABLET (100 MCG TOTAL) DAILY       Thyroid Hormones Protocol Failed - 8/12/2020 11:05 PM        Failed - TSH on file in past 12 months for patient age 12 & older     TSH   Date Value Ref Range Status   07/08/2019 4.57 0.30 - 5.00 uIU/mL Final                   Passed - Provider visit in past 12 months or next 3 months     Last office visit with prescriber/PCP: 1/14/2019 Sabas Roe MD OR same dept: 8/19/2019 Macy Mendoza NP OR same specialty: 8/19/2019 Macy Mendoza NP  Last physical: Visit date not found Last MTM visit: Visit date not found   Next visit within 3 mo: Visit date not found  Next physical within 3 mo: Visit date not found  Prescriber OR PCP: Sabas Roe MD  Last diagnosis associated with med order: 1. Hypothyroidism, unspecified type  - levothyroxine (SYNTHROID, LEVOTHROID) 100 MCG tablet [Pharmacy Med Name: LEVOTHYROXINE SODIUM 100 MCG Tablet]; TAKE 1 TABLET (100 MCG TOTAL) DAILY  Dispense: 90 tablet; Refill: 0    If protocol passes may refill for 12 months if within 3 months of last provider visit (or a total of 15 months).

## 2021-06-10 NOTE — TELEPHONE ENCOUNTER
I will refill her thyroid medication but she is due for a wellness exam in the next several months.

## 2021-06-11 NOTE — PROGRESS NOTES
Assessment and Plan:     Patient has been advised of split billing requirements and indicates understanding: Yes  1. Medicare annual wellness visit, subsequent: Tdap and Pneumonia shot given today. She is fasted since 6am, will check full labs.     2. Hypothyroidism, unspecified type: TSH ordered today. She continues on Levothyroxine 100mcg daily. No symptoms today. Stable.   - Thyroid Stimulating Hormone (TSH)    3. Closed displaced fracture of right femoral neck (H): Post bipolar hemiarthroplasty 05/28. She is doing very well post-operatively. She continues to walk daily and go to exercise twice weekly.     4. Glaucoma suspect, bilateral: Known bilateral glaucoma. She sees Dr. Terry, [facility] every 6 months, due for follow up in December. She is no longer driving. Stable.     5. Asymptomatic menopausal state: With recent hip fracture, will check a Dexa scan, suspected osteoporosis.   - DXA Bone Density Scan; Future    6. Screening for endocrine, nutritional, metabolic and immunity disorder: The patient was fasted today, will check labs.   - HM1(CBC and Differential)  - Comprehensive Metabolic Panel  - Lipid Cascade FASTING    The patient's current medical problems were reviewed.    The following health maintenance schedule was reviewed with the patient and provided in printed form in the after visit summary:   Health Maintenance   Topic Date Due     TD 18+ HE  12/30/1958     ADVANCE CARE PLANNING  [birth year]/30/1958     ZOSTER VACCINES (1 of 2) 12/30/1989     MEDICARE ANNUAL WELLNESS VISIT  12/30/2004     PNEUMOCOCCAL IMMUNIZATION 65+ LOW/MEDIUM RISK (1 of 2 - PCV13) 12/30/2004     DXA SCAN  12/30/2004     FALL RISK ASSESSMENT  01/14/2020     LIPID  01/14/2024     INFLUENZA VACCINE RULE BASED  Completed     HEPATITIS B VACCINES  Aged Out        Subjective:   Chief Complaint: Jaye Soto is an 79 y.o. female here for a subsequent annual Medicare visit.       HPI:  The patient reports doing well since being  discharged from the TCU on 06/18/20. She was rehabbing there post right femoral neck fracture s/p: bipolar hemiarthroplasty 05/28/20.    She reports doing very well since she came back home. She is currently living in the [facility] [address].    She is walking daily, getting in 8 miles per week. She is also attending exercise class one hour, twice weekly. She continues to walk with a walker when at home, and uses her cane when she is out in public.    She reports recovering well from the surgery.    She reports having two bladder infections, she was using body wipes to cleanse her dilcia-area. She reports once she stopped using these, the infections disappeared.    She reports a history of glaucoma, she sees a specialist at the [facility] every 6 months. She is due for follow up in December. She reports she has not driven in 4 years due to her decreased vision.    Her neighbor brought her today to her appointment.    She reports also seeing a Dermatologist yearly. She will follow up with them in November.     Review of Systems:  Please see above.  The rest of the review of systems are negative for all systems.    Patient Care Team:  Sabas Roe MD as PCP - General (Family Medicine)  Macy Mendoza NP as Assigned PCP   Tiny Stinson CNP.    Patient Active Problem List   Diagnosis     Hypothyroidism     Glaucoma     Closed right hip fracture, initial encounter (H)     Closed displaced fracture of right femoral neck (H)     Past Medical History:   Diagnosis Date     Disease of thyroid gland     Hypothyroidism      Glaucoma     Follows w/ Dr. Nidia Denton in [place], [place]      Osteoporosis     Cervical and Lumbar spine injuries      Past Surgical History:   Procedure Laterality Date     ABDOMINOPLASTY       EYE SURGERY Bilateral     for glaucoma     HYSTERECTOMY      Prolapsed uterus, ovaries in     MENISCECTOMY Right      NE PARTIAL HIP REPLACEMENT Right 5/28/2020    Procedure:  HEMIARTHROPLASTY, HIP, BIPOLAR;  Surgeon: Alix Pihcardo MD;  Location: [facility] Main OR;  Service: Orthopedics     TONSILLECTOMY        Family History   Problem Relation Age of Onset     Glaucoma Father       Social History     Socioeconomic History     Marital status:      Spouse name: Not on file     Number of children: 2     Years of education: Not on file     Highest education level: Not on file   Occupational History     Occupation: RN   Social Needs     Financial resource strain: Not on file     Food insecurity     Worry: Not on file     Inability: Not on file     Transportation needs     Medical: Not on file     Non-medical: Not on file   Tobacco Use     Smoking status: Never Smoker     Smokeless tobacco: Never Used   Substance and Sexual Activity     Alcohol use: No     Frequency: Never     Drug use: No     Sexual activity: Not Currently   Lifestyle     Physical activity     Days per week: Not on file     Minutes per session: Not on file     Stress: Not on file   Relationships     Social connections     Talks on phone: Not on file     Gets together: Not on file     Attends Evangelical service: Not on file     Active member of club or organization: Not on file     Attends meetings of clubs or organizations: Not on file     Relationship status: Not on file     Intimate partner violence     Fear of current or ex partner: Not on file     Emotionally abused: Not on file     Physically abused: Not on file     Forced sexual activity: Not on file   Other Topics Concern     Not on file   Social History Narrative    Diet-        Exercise-        Born [place], just moved from [place]       Current Outpatient Medications   Medication Sig Dispense Refill     aspirin 81 MG EC tablet Take 81 mg by mouth daily.       cholecalciferol, vitamin D3, (VITAMIN D3) 1,000 unit capsule Take 1,000 Units by mouth daily.       cyanocobalamin 100 MCG tablet Take 100 mcg by mouth daily.       latanoprost (XALATAN) 0.005 %  ophthalmic solution Administer 1 drop to both eyes at bedtime.        levothyroxine (SYNTHROID, LEVOTHROID) 100 MCG tablet TAKE 1 TABLET (100 MCG TOTAL) DAILY 90 tablet 0     LUTEIN EXTRACT-ZEAXANTHIN EXT ORAL Take 1 tablet by mouth daily.        lysine 500 mg Tab Take 500 mg by mouth daily.        polyethylene glycol (MIRALAX) 17 gram packet Take 1 packet (17 g total) by mouth daily.  0     timolol maleate (TIMOPTIC) 0.5 % ophthalmic solution Administer 1 drop to both eyes 2 (two) times a day. Am and afternoon       turmeric/turmeric ext/pepr ext (TURMERIC-TURMERIC EXT-PEPPER) 500-3 mg cap Take 1 capsule by mouth daily.        ubidecarenone/vitamin E mixed (COQ10  ORAL) Take 1 tablet by mouth daily.        No current facility-administered medications for this visit.       Objective:   Vital Signs:   Visit Vitals  BP (!) 162/91   Pulse 77   Ht 5' (1.524 m)   Wt 116 lb (52.6 kg)   LMP  (LMP Unknown)   SpO2 97%   BMI 22.65 kg/m       Physical Exam   Constitutional: She is oriented to person, place, and time and well-developed, well-nourished, and in no distress. No distress.   HENT:   Head: Normocephalic and atraumatic.   Right Ear: External ear normal.   Left Ear: External ear normal.   Nose: Nose normal.   Mouth/Throat: Oropharynx is clear and moist. No oropharyngeal exudate.   Eyes: Pupils are equal, round, and reactive to light. Conjunctivae and EOM are normal. No scleral icterus.   Neck: Normal range of motion. Neck supple. No thyromegaly present.   Cardiovascular: Normal rate, regular rhythm and normal heart sounds. Exam reveals no gallop and no friction rub.   No murmur heard.  Pulmonary/Chest: Effort normal and breath sounds normal.   Abdominal: Soft. Bowel sounds are normal. She exhibits no distension and no mass. There is no abdominal tenderness. There is no rebound and no guarding.   Musculoskeletal: Normal range of motion.         General: No tenderness, deformity or edema.      Right hip: Normal.  She exhibits normal range of motion, normal strength, no tenderness, no bony tenderness, no swelling, no crepitus, no deformity and no laceration.   Lymphadenopathy:     She has no cervical adenopathy.   Neurological: She is alert and oriented to person, place, and time. Gait normal.   Skin: Skin is warm and dry. She is not diaphoretic.   Psychiatric: Mood, memory, affect and judgment normal.   Nursing note and vitals reviewed.    Assessment Results 9/30/2020   Activities of Daily Living No help needed   Instrumental Activities of Daily Living 2-4 - Moderate impairment   Get Up and Go Score Less than 12 seconds   Mini Cog Total Score 3   Some recent data might be hidden     A Mini Cog score of 0-2 suggests the possibility of dementia, score of 3-5 suggests no dementia    Identified Health Risks: None

## 2021-06-11 NOTE — TELEPHONE ENCOUNTER
----- Message from Tiny Stinson CNP sent at 10/1/2020  7:41 AM CDT -----  Please call the patient and let her know her TSH is elevated. We need to increase her Levothyroxine dose to 112mcg per day, this has been sent to pharmacy for her. She needs to follow up with Dr. Roe in 8 weeks to recheck her TSH levels.

## 2021-06-11 NOTE — TELEPHONE ENCOUNTER
Can we switch patient over to you as PCP?  Courtney Walters CMA ............... 2:08 PM, 10/01/20

## 2021-06-11 NOTE — TELEPHONE ENCOUNTER
That is just fine, I will take her on. Please have her schedule a follow up with me in 8 weeks. Thanks!

## 2021-06-11 NOTE — TELEPHONE ENCOUNTER
Left voicemail for patient to return call to clinic. When patient returns call, please give them below message.    Please set up follow up appointment with Tiny. Okay to continue with Tiny as PCP.  Courtney Walters CMA ............... 3:15 PM, 10/01/20

## 2021-06-11 NOTE — PATIENT INSTRUCTIONS - HE
Continue your current medications.    Continue your exercise regime.    You received your Tetanus and pneumococcal shot today. It is normal for the injection site to be sore for 24-48 hours.    Dexa scan has been ordered, scheduling will call you to set this up.    Follow up in one year with fasted labs, before then if anything comes up.

## 2021-06-11 NOTE — TELEPHONE ENCOUNTER
Patient Returning Call  Reason for call:  Return call  Information relayed to patient:  Patient was informed of the message below. Patient verbalized understanding.  Patient has additional questions:  Yes  If YES, what are your questions/concerns:  Patient stated that she would like to continue seeing Tiny Stinson CNP and wondering if that's ok.  Okay to leave a detailed message?: No

## 2021-06-13 NOTE — TELEPHONE ENCOUNTER
Patient Returning Call  Reason for call:  Returning call  Information relayed to patient:    Relayed below Provider message to patient.  Patient has additional questions:  Yes  If YES, what are your questions/concerns:    Patient is declining to start medication at this time.  Patient is doing strengthening exercises and fortifying her diet.  Thank you.  Okay to leave a detailed message?: No call back needed

## 2021-06-13 NOTE — PATIENT INSTRUCTIONS - HE
Your TSH is processing, We will call you with results when they are back.    Continue your exercise regime.    We will call you with your bone density results once they are back.

## 2021-06-13 NOTE — TELEPHONE ENCOUNTER
Left voicemail for patient to return call to clinic. When patient returns call, please give them below message.    Courtney Walters CMA ............... 2:42 PM, 12/03/20

## 2021-06-13 NOTE — TELEPHONE ENCOUNTER
Left voicemail for patient to return call to clinic. When patient returns call, please give them below message.    Courtney Walters CMA ............... 1:39 PM, 12/08/20

## 2021-06-13 NOTE — PROGRESS NOTES
Clinic Note    Assessment:     Assessment and Plan:  1. Hypothyroidism, unspecified type: Her last TSH was 22. Will recheck labs today. She continues on daily levothyroxine 112mcg. She reports feeling much better since starting this dose.  - Thyroid Stimulating Hormone (TSH)  - T4, Free  -Continue Levothyroxine dosing.    2. Glaucoma suspect, bilateral: The patient has trouble with her vision. She uses a magnifying glass to read. She also gets books from the [employer]. She reports recently seeing her specialist, she reports having fluid behind her left eye, that will likely need to be removed. [employer] form filled out for the patient today. She will need rides to appointments.        Patient Instructions   Your TSH is processing, We will call you with results when they are back.    Continue your exercise regime.    We will call you with your bone density results once they are back.    Return in about 6 months (around 5/30/2021) for Follow up.         Subjective:      Jaye Soto is a 79 y.o. female presents today for follow up of her hypothyroidism.    She continues to take her Levothyroxine as prescribed. She reports feeling much better since her dose was increased.    She reports that she stopped using her walker last week, her hip felt good enough to do this.    She continues to walk about 8 miles per week.    She reports overall feeling quite well.    She is wondering if her dexascan results are back. She prefers to get her calcium from dietary sources. She continues on vitamin d supplementation. She does not want go on medication if she has osteoporosis.    She would like [employer] paperwork filled out today. She has not driven in over 4 years, due to her glaucoma and blurred vision. She reports that the people she has counted on for rides are dying, and she will need a  soon.     She denies a fever, chills, cough, shortness of breath, chest pain, chest pressure, nausea, vomiting,  abdominal pain, urinary or bowel symptoms.    The following portions of the patient's history were reviewed and updated as appropriate.    Review of Systems:    Review is otherwise negative except for what is mentioned above.     Social Hx:    Social History     Tobacco Use   Smoking Status Never Smoker   Smokeless Tobacco Never Used         Objective:     Vitals:    11/30/20 1258   BP: 138/78   Patient Site: Right Arm   Patient Position: Sitting   Cuff Size: Adult Regular   Pulse: 80   Weight: 115 lb (52.2 kg)       Exam:  General: No apparent distress. Calm. Alert and Oriented X3. Pt behavior is appropriate.  Head:Atraumatic. Normocephalic.  Lungs: Clear to auscultation, normal respiratory effort and rate.   Heart/Pulses: Regular rate and rhythm, no murmurs, gallops, or rubs. Capillary refill <2 seconds. No edema.   Musculoskeletal: Walks without an assistive device without difficulty.   Neurologic: Interactive, alert, no focal findings.  Skin: Warm, dry.       Patient Active Problem List   Diagnosis     Hypothyroidism     Glaucoma     Closed right hip fracture, initial encounter (H)     Closed displaced fracture of right femoral neck (H)     Current Outpatient Medications   Medication Sig Dispense Refill     aspirin 81 MG EC tablet Take 81 mg by mouth every other day.        cholecalciferol, vitamin D3, (VITAMIN D3) 1,000 unit capsule Take 1,000 Units by mouth daily.       cyanocobalamin 100 MCG tablet Take 100 mcg by mouth daily.       latanoprost (XALATAN) 0.005 % ophthalmic solution Administer 1 drop to both eyes at bedtime.        levothyroxine (SYNTHROID) 112 MCG tablet Take 1 tablet (112 mcg total) by mouth daily. 90 tablet 0     LUTEIN EXTRACT-ZEAXANTHIN EXT ORAL Take 1 tablet by mouth daily.        lysine 500 mg Tab Take 500 mg by mouth daily.        polyethylene glycol (MIRALAX) 17 gram packet Take 1 packet (17 g total) by mouth daily.  0     timolol maleate (TIMOPTIC) 0.5 % ophthalmic solution  Administer 1 drop to both eyes 2 (two) times a day. Am and afternoon       turmeric/turmeric ext/pepr ext (TURMERIC-TURMERIC EXT-PEPPER) 500-3 mg cap Take 1 capsule by mouth daily.        ubidecarenone/vitamin E mixed (COQ10  ORAL) Take 1 tablet by mouth daily.        vitamin D3/vitamin K2, MK4, (K2 PLUS D3 ORAL) Take 1 tablet by mouth daily. Vitamin K2 (MK7) with Vitamin D3 Supplement       No current facility-administered medications for this visit.      Tiny Stinson, Adult-Geriatric Nurse Practitioner  [facility] - Internal Medicine Team     11/30/2020

## 2021-06-13 NOTE — TELEPHONE ENCOUNTER
----- Message from Tiny Stinson CNP sent at 12/3/2020  1:43 PM CST -----  Please call the patient and update her of the following:    Her bone density scan showed that she has OSTEOPOROSIS and HIGH fracture risk. I would suggest an osteo consult to start medication for this. I know that she did not want to do this, but I would strongly suggest it.     Please let me know what her wishes are.    Thank you!

## 2021-06-14 NOTE — TELEPHONE ENCOUNTER
Refill Approved    Rx renewed per Medication Renewal Policy. Medication was last renewed on 11/2/20.    Agueda Dunn, [facility] Triage/Med Refill 12/28/2020     Requested Prescriptions   Pending Prescriptions Disp Refills     levothyroxine (SYNTHROID, LEVOTHROID) 112 MCG tablet [Pharmacy Med Name: LEVOTHYROXINE 0.112MG (112MCG) TABS] 90 tablet 0     Sig: TAKE 1 TABLET(112 MCG) BY MOUTH DAILY       Thyroid Hormones Protocol Passed - 12/27/2020  3:23 AM        Passed - Provider visit in past 12 months or next 3 months     Last office visit with prescriber/PCP: 11/30/2020 Tiny Stinson CNP OR same dept: 11/30/2020 Tiny Stinson CNP OR same specialty: 11/30/2020 Tiny Stinson CNP  Last physical: 9/30/2020 Last MTM visit: Visit date not found   Next visit within 3 mo: Visit date not found  Next physical within 3 mo: Visit date not found  Prescriber OR PCP: Tiny Stinson CNP  Last diagnosis associated with med order: 1. Hypothyroidism, unspecified type  - levothyroxine (SYNTHROID, LEVOTHROID) 112 MCG tablet [Pharmacy Med Name: LEVOTHYROXINE 0.112MG (112MCG) TABS]; TAKE 1 TABLET(112 MCG) BY MOUTH DAILY  Dispense: 90 tablet; Refill: 0    If protocol passes may refill for 12 months if within 3 months of last provider visit (or a total of 15 months).             Passed - TSH on file in past 12 months for patient age 12 & older     TSH   Date Value Ref Range Status   11/30/2020 4.34 0.30 - 5.00 uIU/mL Final

## 2021-06-16 PROBLEM — S72.001A CLOSED RIGHT HIP FRACTURE, INITIAL ENCOUNTER (H): Status: ACTIVE | Noted: 2020-05-27

## 2021-06-16 PROBLEM — E03.9 HYPOTHYROIDISM: Status: ACTIVE | Noted: 2019-01-14

## 2021-06-16 PROBLEM — H40.9 GLAUCOMA: Status: ACTIVE | Noted: 2019-01-14

## 2021-06-16 PROBLEM — S72.001A CLOSED DISPLACED FRACTURE OF RIGHT FEMORAL NECK (H): Status: ACTIVE | Noted: 2020-05-28

## 2021-06-18 NOTE — LETTER
Letter by Sabas Roe MD at      Author: Sabas Roe MD Service: -- Author Type: --    Filed:  Encounter Date: 1/15/2019 Status: (Other)       Jaye Soto  [address]  [place] [address]             January 15, 2019         Dear Ms. Soto,    Below are the results from your recent visit: Rate is off, actually you are not getting enough thyroid medication I am increasing the dose from 75 to 100 mcg.  Just the slightest elevation of cholesterol but focus on good diet and exercise.  Kidney tests are normal, sugar is normal no diabetes.    Resulted Orders   Thyroid Stimulating Hormone (TSH)   Result Value Ref Range    TSH 16.42 (H) 0.30 - 5.00 uIU/mL   Lipid Cascade   Result Value Ref Range    Cholesterol 228 (H) <=199 mg/dL    Triglycerides 97 <=149 mg/dL    HDL Cholesterol 56 >=50 mg/dL    LDL Calculated 153 (H) <=129 mg/dL    Patient Fasting > 8hrs? Yes    Basic Metabolic Panel   Result Value Ref Range    Sodium 140 136 - 145 mmol/L    Potassium 4.0 3.5 - 5.0 mmol/L    Chloride 102 98 - 107 mmol/L    CO2 28 22 - 31 mmol/L    Anion Gap, Calculation 10 5 - 18 mmol/L    Glucose 97 70 - 125 mg/dL    Calcium 9.2 8.5 - 10.5 mg/dL    BUN 11 8 - 28 mg/dL    Creatinine 0.65 0.60 - 1.10 mg/dL    GFR MDRD Af Amer >60 >60 mL/min/1.73m2    GFR MDRD Non Af Amer >60 >60 mL/min/1.73m2    Narrative    Fasting Glucose reference range is 70-99 mg/dL per  American Diabetes Association (ADA) guidelines.            Please call with questions or contact us using [email or web address].    Sincerely,        Electronically signed by Sabas Roe MD

## 2021-06-19 NOTE — LETTER
Letter by Sabas Roe MD at      Author: Sabas Roe MD Service: -- Author Type: --    Filed:  Encounter Date: 9/11/2019 Status: (Other)       Parent/guardian of Jaye Soto  [address] [place] [address] [address] [place] [id number]             September 11, 2019         Dear Jaye Soto,    Below are the results from Jaye's recent visit: Laboratory studies ordered because of eye issues, lab results normal also faxed to the doing physician.    Resulted Orders   Sedimentation Rate   Result Value Ref Range    Sed Rate 13 0 - 20 mm/hr   C-Reactive Protein (CRP)   Result Value Ref Range    CRP <0.1 0.0 - 0.8 mg/dL   HM1 (CBC with Diff)   Result Value Ref Range    WBC 5.3 4.0 - 11.0 thou/uL    RBC 4.40 3.80 - 5.40 mill/uL    Hemoglobin 13.7 12.0 - 16.0 g/dL    Hematocrit 41.1 35.0 - 47.0 %    MCV 94 80 - 100 fL    MCH 31.2 27.0 - 34.0 pg    MCHC 33.3 32.0 - 36.0 g/dL    RDW 12.3 11.0 - 14.5 %    Platelets 205 140 - 440 thou/uL    MPV 8.3 7.0 - 10.0 fL    Neutrophils % 52 50 - 70 %    Lymphocytes % 35 20 - 40 %    Monocytes % 9 2 - 10 %    Eosinophils % 3 0 - 6 %    Basophils % 0 0 - 2 %    Neutrophils Absolute 2.8 2.0 - 7.7 thou/uL    Lymphocytes Absolute 1.9 0.8 - 4.4 thou/uL    Monocytes Absolute 0.5 0.0 - 0.9 thou/uL    Eosinophils Absolute 0.2 0.0 - 0.4 thou/uL    Basophils Absolute 0.0 0.0 - 0.2 thou/uL            Please call with questions or contact us using [employer].    Sincerely,        Electronically signed by Sabas Roe MD

## 2021-06-20 NOTE — LETTER
Letter by Cintia Cabrera CNP at      Author: Cintia Cabrera CNP Service: -- Author Type: --    Filed:  Encounter Date: 6/11/2020 Status: (Other)         Patient: Jaye Soto   MR Number: [id number]   YOB: 1940   Date of Visit: 6/11/2020     Code Status:  DNR  Visit Type: Problem Visit     Facility:  [facility] [[id number]]             History of Present Illness: Jaye Soto is a 79 y.o. female who I am seeing today for follow up right femoral neck fracture,on the TCU. Pt recently hospitalized on 5/27/2020.  History includes hypothyroidism.  Patient presented to the ER after a mechanical fall in which she slipped over her wheeled wardrobe and fell onto the right side.  Patient found to have a right femoral neck fracture.  Patient status post right bipolar hemiarthroplasty on 5/28/2020.  Patient had mild acute blood loss anemia postoperative with hemoglobin down to 9.7.  Asymptomatic.  Tolerated well.  She also had postop urinary retention initially and required straight cath but later resolved.      Today patient lying in bed.  Patient with recent right femoral neck fracture with ORIF.  Patient continues on Tylenol and oxycodone for pain.  She is moving quite well.  She continues on Xarelto for DVT prophylactics.  Incision dry and intact with Tegaderm.  Edema has greatly resolved.  She continues on BRYANT hose.  Acute blood loss anemia.  Hemoglobin stable at 9.4.  Patient was given prune juice last evening and had 2 bowel movement today.  Patient complaining of dry eyes.    Active Ambulatory Problems     Diagnosis Date Noted   ? Hypothyroidism 01/14/2019   ? Glaucoma 01/14/2019   ? Closed right hip fracture, initial encounter (H) 05/27/2020   ? Closed displaced fracture of right femoral neck (H) 05/28/2020     Resolved Ambulatory Problems     Diagnosis Date Noted   ? No Resolved Ambulatory Problems     Past Medical History:   Diagnosis Date   ? Disease of thyroid  gland    ? Osteoporosis        Current Outpatient Medications   Medication Sig   ? cholecalciferol, vitamin D3, (VITAMIN D3) 1,000 unit capsule Take 1,000 Units by mouth daily.   ? cyanocobalamin 100 MCG tablet Take 100 mcg by mouth daily.   ? latanoprost (XALATAN) 0.005 % ophthalmic solution Administer 1 drop to both eyes at bedtime.    ? levothyroxine (SYNTHROID, LEVOTHROID) 100 MCG tablet TAKE 1 TABLET (100 MCG TOTAL) BY MOUTH DAILY.   ? LUTEIN EXTRACT-ZEAXANTHIN EXT ORAL Take 1 tablet by mouth daily.    ? lysine 500 mg Tab Take 500 mg by mouth daily.    ? oxyCODONE (ROXICODONE) 5 MG immediate release tablet Take 0.5 tablets (2.5 mg total) by mouth every 4 (four) hours as needed.   ? polyethylene glycol (MIRALAX) 17 gram packet Take 1 packet (17 g total) by mouth daily.   ? rivaroxaban ANTICOAGULANT (XARELTO) 10 mg tablet Take 1 tablet (10 mg total) by mouth daily.   ? timolol maleate (TIMOPTIC) 0.5 % ophthalmic solution Administer 1 drop to both eyes 2 (two) times a day. Am and afternoon   ? turmeric/turmeric ext/pepr ext (TURMERIC-TURMERIC EXT-PEPPER) 500-3 mg cap Take 1 capsule by mouth daily.    ? ubidecarenone/vitamin E mixed (COQ10  ORAL) Take 1 tablet by mouth daily.        Allergies   Allergen Reactions   ? Ceftin [Cefuroxime Axetil] Anaphylaxis     Tongue swelling   ? Dorzolamide Shortness Of Breath and Itching     Short of breath,    ? Naprosyn [Naproxen] Anaphylaxis     Tongue swelling, throat closing   ? Cat Dander Cough     Nasal congestion   ? Codeine Hives   ? Latex Hives   ? Oyster Nausea And Vomiting   ? Paba Forte Hives   ? Penicillins Hives     1950's         Review of Systems  No fevers or chills. No headache, lightheadedness or dizziness. No SOB, chest pains or palpitations. Appetite is good. No nausea, vomiting, constipation or diarrhea. No dysuria, frequency, burning or pain with urination.  Pain well controlled in right hip with Tylenol and oxycodone.  Otherwise review of systems are  negative.     Physical Exam  PHYSICAL EXAMINATION:  Vital signs: /73, pulse 79, respirations 16, temperature 98.1, O2 sat 98% on room air.  Weight 122.2 pounds.  General: Awake, Alert, oriented x3, appropriately, follows simple commands, conversant  HEENT:Pink conjunctiva, anicteric sclerae, moist oral mucosa  NECK: Supple  CVS:  S1  S2, without murmur or gallop.   LUNG: Clear to auscultation, No wheezes, rales or rhonci.  BACK: No kyphosis of the thoracic spine  ABDOMEN: Soft, nontender to palpation, with positive bowel sounds  EXTREMITIES: Movesboth upper and lower extremities with generalized weakness , trace pedal edema, no calf tenderness BRYANT hose on.   SKIN: Warm and dry, no rashes or erythema noted.  Incision to right hip intact staples.  No redness or warmth.  No drainage.  NEUROLOGIC: Intact, pulses palpable  PSYCHIATRIC: Cognition intact pleasant affect.       Labs:   Lab Results   Component Value Date    WBC 10.3 05/28/2020    HGB 9.4 (L) 06/02/2020    HCT 37.2 05/28/2020    MCV 94 05/28/2020     05/28/2020     Results for orders placed or performed during the hospital encounter of 05/27/20   Basic Metabolic Panel   Result Value Ref Range    Sodium 138 136 - 145 mmol/L    Potassium 4.2 3.5 - 5.0 mmol/L    Chloride 104 98 - 107 mmol/L    CO2 27 22 - 31 mmol/L    Anion Gap, Calculation 7 5 - 18 mmol/L    Glucose 105 70 - 125 mg/dL    Calcium 8.4 (L) 8.5 - 10.5 mg/dL    BUN 6 (L) 8 - 28 mg/dL    Creatinine 0.59 (L) 0.60 - 1.10 mg/dL    GFR MDRD Af Amer >60 >60 mL/min/1.73m2    GFR MDRD Non Af Amer >60 >60 mL/min/1.73m2           Assessment/Plan:  1. Closed displaced fracture of right femoral neck (H)   patient TTWB.    2. History of right hip hemiarthroplasty   continue Xarelto for DVT prophylactics.  Lower extremity edema improving.    Continue BRYANT hose.  On the a.m. off at at bedtime.     3. Acute blood loss anemia   hemoglobin today 9.4.  Follow-up hemoglobin on Monday.    4. Pain management    pain well controlled with Tylenol and oxycodone.   5.  Dry eyes  Natural tears 1-2 gtts four times a day prn .           Electronically signed by: Cintia Cabrera CNP

## 2021-06-20 NOTE — LETTER
Letter by Cintia Cabrera CNP at      Author: Cintia Cabrera CNP Service: -- Author Type: --    Filed:  Encounter Date: 6/16/2020 Status: (Other)         Patient: Jaye Soto   MR Number: [id number]   YOB: 1940   Date of Visit: 6/16/2020     Code Status:  DNR  Visit Type: Discharge Summary     Facility:  [facility] [[id number]]             History of Present Illness: Jaye Soto is a 79 y.o. female who I am seeing today for discharge from the TCU. Pt recently hospitalized on 5/27/2020.  History includes hypothyroidism.  Patient presented to the ER after a mechanical fall in which she slipped over her wheeled wardrobe and fell onto the right side.  Patient found to have a right femoral neck fracture.  Patient status post right bipolar hemiarthroplasty on 5/28/2020.  Patient had mild acute blood loss anemia postoperative with hemoglobin down to 9.7.  Asymptomatic.  Tolerated well.  She also had postop urinary retention initially and required straight cath but later resolved.      Today patient lying in bed.  Patient with recent right femoral neck fracture with ORIF.  Pain well controlled with Tylenol and oxycodone.  Patient continues to be toe-touch weightbearing.  She has a follow-up with Ortho on Thursday.  She continues on Xarelto for DVT prophylaxis.  Acute blood loss anemia.  Hemoglobin now 11.3 up from 9.7.  Constipation.  Relieved with prune juice.  Postoperative urinary retention.  This has resolved.  Voiding adequately.      Active Ambulatory Problems     Diagnosis Date Noted   ? Hypothyroidism 01/14/2019   ? Glaucoma 01/14/2019   ? Closed right hip fracture, initial encounter (H) 05/27/2020   ? Closed displaced fracture of right femoral neck (H) 05/28/2020     Resolved Ambulatory Problems     Diagnosis Date Noted   ? No Resolved Ambulatory Problems     Past Medical History:   Diagnosis Date   ? Disease of thyroid gland    ? Osteoporosis        Current  Outpatient Medications   Medication Sig   ? cholecalciferol, vitamin D3, (VITAMIN D3) 1,000 unit capsule Take 1,000 Units by mouth daily.   ? cyanocobalamin 100 MCG tablet Take 100 mcg by mouth daily.   ? latanoprost (XALATAN) 0.005 % ophthalmic solution Administer 1 drop to both eyes at bedtime.    ? levothyroxine (SYNTHROID, LEVOTHROID) 100 MCG tablet TAKE 1 TABLET (100 MCG TOTAL) BY MOUTH DAILY.   ? LUTEIN EXTRACT-ZEAXANTHIN EXT ORAL Take 1 tablet by mouth daily.    ? lysine 500 mg Tab Take 500 mg by mouth daily.    ? oxyCODONE (ROXICODONE) 5 MG immediate release tablet Take 0.5 tablets (2.5 mg total) by mouth every 4 (four) hours as needed.   ? polyethylene glycol (MIRALAX) 17 gram packet Take 1 packet (17 g total) by mouth daily.   ? rivaroxaban ANTICOAGULANT (XARELTO) 10 mg tablet Take 1 tablet (10 mg total) by mouth daily.   ? timolol maleate (TIMOPTIC) 0.5 % ophthalmic solution Administer 1 drop to both eyes 2 (two) times a day. Am and afternoon   ? turmeric/turmeric ext/pepr ext (TURMERIC-TURMERIC EXT-PEPPER) 500-3 mg cap Take 1 capsule by mouth daily.    ? ubidecarenone/vitamin E mixed (COQ10  ORAL) Take 1 tablet by mouth daily.        Allergies   Allergen Reactions   ? Ceftin [Cefuroxime Axetil] Anaphylaxis     Tongue swelling   ? Dorzolamide Shortness Of Breath and Itching     Short of breath,    ? Naprosyn [Naproxen] Anaphylaxis     Tongue swelling, throat closing   ? Cat Dander Cough     Nasal congestion   ? Codeine Hives   ? Latex Hives   ? Oyster Nausea And Vomiting   ? Paba Forte Hives   ? Penicillins Hives     1950's         Review of Systems  No fevers or chills. No headache, lightheadedness or dizziness. No SOB, chest pains or palpitations. Appetite is good. No nausea, vomiting, constipation or diarrhea. No dysuria, frequency, burning or pain with urination.  Pain well controlled in right hip with Tylenol and occasional oxycodone.  Otherwise review of systems are negative.     Physical  Exam  PHYSICAL EXAMINATION:  Vital signs: /74, pulse 78, respirations 16, temperature 98.8, O2 sat 96% on room air.  Weight 122.2 pounds.  General: Awake, Alert, oriented x3, appropriately, follows simple commands, conversant  HEENT:Pink conjunctiva, anicteric sclerae, moist oral mucosa  NECK: Supple  CVS:  S1  S2, without murmur or gallop.   LUNG: Clear to auscultation, No wheezes, rales or rhonci.  BACK: No kyphosis of the thoracic spine  ABDOMEN: Soft, nontender to palpation, with positive bowel sounds  EXTREMITIES: Movesboth upper and lower extremities with generalized weakness , no pedal edema, no calf tenderness BRYANT hose on.   SKIN: Warm and dry, no rashes or erythema noted.  Incision to right hip intact with dry sterile dressing.   NEUROLOGIC: Intact, pulses palpable  PSYCHIATRIC: Cognition intact pleasant affect.       Labs:   Lab Results   Component Value Date    WBC 10.3 05/28/2020    HGB 11.3 (L) 06/15/2020    HCT 37.2 05/28/2020    MCV 94 05/28/2020     05/28/2020     Results for orders placed or performed during the hospital encounter of 05/27/20   Basic Metabolic Panel   Result Value Ref Range    Sodium 138 136 - 145 mmol/L    Potassium 4.2 3.5 - 5.0 mmol/L    Chloride 104 98 - 107 mmol/L    CO2 27 22 - 31 mmol/L    Anion Gap, Calculation 7 5 - 18 mmol/L    Glucose 105 70 - 125 mg/dL    Calcium 8.4 (L) 8.5 - 10.5 mg/dL    BUN 6 (L) 8 - 28 mg/dL    Creatinine 0.59 (L) 0.60 - 1.10 mg/dL    GFR MDRD Af Amer >60 >60 mL/min/1.73m2    GFR MDRD Non Af Amer >60 >60 mL/min/1.73m2           Assessment/Plan:  1. Closed displaced fracture of right femoral neck (H)   patient TTWB.  Follow-up with Ortho on Thursday.   2. History of right hip hemiarthroplasty   continue Xarelto for DVT prophylactics.  Ortho to discontinue.  Lower extremity edema improving.    Continue BRYANT hose.  On the a.m. off at at bedtime.     3. Acute blood loss anemia   hemoglobin now 11.3 up from 9.7.     4. Pain management   pain  well controlled with Tylenol and oxycodone.  Patient using oxycodone 1-2 times daily.  Will attempt to wean off at home.   5.  Dry eyes  Natural tears 1-2 gtts four times a day prn .     Okay to DC home with current meds, treatments and narcotics.  Home PT, OT, home health aide and RN.  Patient reports she would not like to start home care services right away until she has her follow-up on Thursday.  Follow-up with primary care provider in 1 week.    DISCHARGE PLAN/FACE TO FACE:  I certify that this patient is under my care and that I, or a nurse practitioner or physician's assistant working with me, had a face-to-face encounter that meets the physician face-to-face encounter requirements with this patient.       I certify that, based on my findings, the following services are medically necessary home health services.    My clinical findings support the need for the above skilled services.    This patient is homebound because:   Status post femoral neck fracture with ORIF.    The patient is, or has been, under my care and I have initiated the establishment of the plan of care. This patient will be followed by a physician who will periodically review the plan of care.    Total time spent this visit was 35 minutes with greater than 50% of the time spent face-to-face with patient reviewing discharge medications including pain management and attempting to wean off narcotics at home.  Follow-up with primary care provider and Ortho.  Reviewed weightbearing status as well as equipment for home.  Patient will go home with wheelchair.    Electronically signed by: Cintia Cabrera CNP

## 2021-06-20 NOTE — LETTER
Letter by Cintia Cabrera CNP at      Author: Cintia Cabrera CNP Service: -- Author Type: --    Filed:  Encounter Date: 6/2/2020 Status: (Other)         Patient: Jaye Soto   MR Number: [id number]   YOB: 1940   Date of Visit: 6/2/2020     Code Status:  DNR  Visit Type: Problem Visit     Facility:  [facility] [[id number]]             History of Present Illness: Jaye Soto is a 79 y.o. female who I am seeing today for follow up on the TCU. Pt recently hospitalized on 5/27/2020.  History includes hypothyroidism.  Patient presented to the ER after a mechanical fall in which she slipped over her wheeled wardrobe and fell onto the right side.  Patient found to have a right femoral neck fracture.  Patient status post right bipolar hemiarthroplasty on 5/28/2020.  Patient had mild acute blood loss anemia postoperative with hemoglobin down to 9.7.  Asymptomatic.  Tolerated well.  She also had postop urinary retention initially and required straight cath but later resolved.      Today patient lying in bed.  Patient is moving quite well.  Pain well controlled with Tylenol and Roxicodone.  Her bowels are moving regularly.  She continues on DVT prophylaxis with Xarelto.  She does have some lower extremity edema secondary to fluids given during hospitalization.  Patient continues in BRYANT hose.  COVID-19 negative.  Patient denies any shortness of breath, chest pain, sore throat fever or chills.          Active Ambulatory Problems     Diagnosis Date Noted   ? Hypothyroidism 01/14/2019   ? Glaucoma 01/14/2019   ? Closed right hip fracture, initial encounter (H) 05/27/2020   ? Closed displaced fracture of right femoral neck (H) 05/28/2020     Resolved Ambulatory Problems     Diagnosis Date Noted   ? No Resolved Ambulatory Problems     Past Medical History:   Diagnosis Date   ? Disease of thyroid gland    ? Osteoporosis        Current Outpatient Medications   Medication Sig   ?  cholecalciferol, vitamin D3, (VITAMIN D3) 1,000 unit capsule Take 1,000 Units by mouth daily.   ? cyanocobalamin 100 MCG tablet Take 100 mcg by mouth daily.   ? latanoprost (XALATAN) 0.005 % ophthalmic solution Administer 1 drop to both eyes at bedtime.    ? levothyroxine (SYNTHROID, LEVOTHROID) 100 MCG tablet TAKE 1 TABLET (100 MCG TOTAL) BY MOUTH DAILY.   ? LUTEIN EXTRACT-ZEAXANTHIN EXT ORAL Take 1 tablet by mouth daily.    ? lysine 500 mg Tab Take 500 mg by mouth daily.    ? oxyCODONE (ROXICODONE) 5 MG immediate release tablet Take 0.5 tablets (2.5 mg total) by mouth every 4 (four) hours as needed.   ? polyethylene glycol (MIRALAX) 17 gram packet Take 1 packet (17 g total) by mouth daily.   ? rivaroxaban ANTICOAGULANT (XARELTO) 10 mg tablet Take 1 tablet (10 mg total) by mouth daily.   ? timolol maleate (TIMOPTIC) 0.5 % ophthalmic solution Administer 1 drop to both eyes 2 (two) times a day. Am and afternoon   ? turmeric/turmeric ext/pepr ext (TURMERIC-TURMERIC EXT-PEPPER) 500-3 mg cap Take 1 capsule by mouth daily.    ? ubidecarenone/vitamin E mixed (COQ10  ORAL) Take 1 tablet by mouth daily.        Allergies   Allergen Reactions   ? Ceftin [Cefuroxime Axetil] Anaphylaxis     Tongue swelling   ? Dorzolamide Shortness Of Breath and Itching     Short of breath,    ? Naprosyn [Naproxen] Anaphylaxis     Tongue swelling, throat closing   ? Cat Dander Cough     Nasal congestion   ? Codeine Hives   ? Latex Hives   ? Oyster Nausea And Vomiting   ? Paba Forte Hives   ? Penicillins Hives     1950's         Review of Systems  No fevers or chills. No headache, lightheadedness or dizziness. No SOB, chest pains or palpitations. Appetite is good. No nausea, vomiting, constipation or diarrhea. No dysuria, frequency, burning or pain with urination.  Pain well controlled in right hip with Tylenol and oxycodone.  Otherwise review of systems are negative.     Physical Exam  PHYSICAL EXAMINATION:  Vital signs: /74, pulse  77, respirations 18, temperature 98.2, O2 sat 98% on room air.  Weight 122.2 pounds.  General: Awake, Alert, oriented x3, appropriately, follows simple commands, conversant  HEENT:PERRLA, Pink conjunctiva, anicteric sclerae, moist oral mucosa  NECK: Supple, without any lymphadenopathy, or masses  CVS:  S1  S2, without murmur or gallop.   LUNG: Clear to auscultation, No wheezes, rales or rhonci.  BACK: No kyphosis of the thoracic spine  ABDOMEN: Soft, nontender to palpation, with positive bowel sounds  EXTREMITIES: Movesboth upper and lower extremities with generalized weakness , 2+ pedal edema, no calf tenderness BRYANT hose on.   SKIN: Warm and dry, no rashes or erythema noted.  Incision to right hip intact with Tegaderm dressing.  NEUROLOGIC: Intact, pulses palpable  PSYCHIATRIC: Cognition intact pleasant affect.       Labs:   Lab Results   Component Value Date    WBC 10.3 05/28/2020    HGB 9.7 (L) 05/30/2020    HCT 37.2 05/28/2020    MCV 94 05/28/2020     05/28/2020     Results for orders placed or performed during the hospital encounter of 05/27/20   Basic Metabolic Panel   Result Value Ref Range    Sodium 138 136 - 145 mmol/L    Potassium 4.2 3.5 - 5.0 mmol/L    Chloride 104 98 - 107 mmol/L    CO2 27 22 - 31 mmol/L    Anion Gap, Calculation 7 5 - 18 mmol/L    Glucose 105 70 - 125 mg/dL    Calcium 8.4 (L) 8.5 - 10.5 mg/dL    BUN 6 (L) 8 - 28 mg/dL    Creatinine 0.59 (L) 0.60 - 1.10 mg/dL    GFR MDRD Af Amer >60 >60 mL/min/1.73m2    GFR MDRD Non Af Amer >60 >60 mL/min/1.73m2           Assessment/Plan:  1. Closed displaced fracture of right femoral neck (H)   patient weightbearing as tolerated.   2. History of right hip hemiarthroplasty   continue Xarelto for DVT prophylactics.  Lower extremity edema.  Continue BRYANT hose.  On the a.m. off at at bedtime.     3. Acute blood loss anemia   hemoglobin today 9.7.  Follow-up hemoglobin in 1 week.   4. Pain management   pain well controlled with Tylenol and oxycodone.          35 minutes spent of which greater than 50% was face to face interviewing patient, nursing staff and therapy.    Electronically signed by: Cintia Cabrera CNP

## 2021-06-20 NOTE — LETTER
Letter by Cintia Cabrera CNP at      Author: Cintia Cabrera CNP Service: -- Author Type: --    Filed:  Encounter Date: 6/9/2020 Status: (Other)         Patient: Jaye Soto   MR Number: [id number]   YOB: 1940   Date of Visit: 6/9/2020     Code Status:  DNR  Visit Type: Problem Visit     Facility:  [facility] [[id number]]             History of Present Illness: Jaye Soto is a 79 y.o. female who I am seeing today for follow up right femoral neck fracture,on the TCU. Pt recently hospitalized on 5/27/2020.  History includes hypothyroidism.  Patient presented to the ER after a mechanical fall in which she slipped over her wheeled wardrobe and fell onto the right side.  Patient found to have a right femoral neck fracture.  Patient status post right bipolar hemiarthroplasty on 5/28/2020.  Patient had mild acute blood loss anemia postoperative with hemoglobin down to 9.7.  Asymptomatic.  Tolerated well.  She also had postop urinary retention initially and required straight cath but later resolved.      Today patient lying in bed.  Incision dry with Tegaderm dressing.  Edema decreased on the lower extremities.  She continues with BRYANT hose.  Pain well controlled with Tylenol and oxycodone.  She continues on Xarelto for DVT prophylaxis.  Mild acute blood loss anemia.  Hemoglobin 9.4.      Active Ambulatory Problems     Diagnosis Date Noted   ? Hypothyroidism 01/14/2019   ? Glaucoma 01/14/2019   ? Closed right hip fracture, initial encounter (H) 05/27/2020   ? Closed displaced fracture of right femoral neck (H) 05/28/2020     Resolved Ambulatory Problems     Diagnosis Date Noted   ? No Resolved Ambulatory Problems     Past Medical History:   Diagnosis Date   ? Disease of thyroid gland    ? Osteoporosis        Current Outpatient Medications   Medication Sig   ? cholecalciferol, vitamin D3, (VITAMIN D3) 1,000 unit capsule Take 1,000 Units by mouth daily.   ? cyanocobalamin  100 MCG tablet Take 100 mcg by mouth daily.   ? latanoprost (XALATAN) 0.005 % ophthalmic solution Administer 1 drop to both eyes at bedtime.    ? levothyroxine (SYNTHROID, LEVOTHROID) 100 MCG tablet TAKE 1 TABLET (100 MCG TOTAL) BY MOUTH DAILY.   ? LUTEIN EXTRACT-ZEAXANTHIN EXT ORAL Take 1 tablet by mouth daily.    ? lysine 500 mg Tab Take 500 mg by mouth daily.    ? oxyCODONE (ROXICODONE) 5 MG immediate release tablet Take 0.5 tablets (2.5 mg total) by mouth every 4 (four) hours as needed.   ? polyethylene glycol (MIRALAX) 17 gram packet Take 1 packet (17 g total) by mouth daily.   ? rivaroxaban ANTICOAGULANT (XARELTO) 10 mg tablet Take 1 tablet (10 mg total) by mouth daily.   ? timolol maleate (TIMOPTIC) 0.5 % ophthalmic solution Administer 1 drop to both eyes 2 (two) times a day. Am and afternoon   ? turmeric/turmeric ext/pepr ext (TURMERIC-TURMERIC EXT-PEPPER) 500-3 mg cap Take 1 capsule by mouth daily.    ? ubidecarenone/vitamin E mixed (COQ10  ORAL) Take 1 tablet by mouth daily.        Allergies   Allergen Reactions   ? Ceftin [Cefuroxime Axetil] Anaphylaxis     Tongue swelling   ? Dorzolamide Shortness Of Breath and Itching     Short of breath,    ? Naprosyn [Naproxen] Anaphylaxis     Tongue swelling, throat closing   ? Cat Dander Cough     Nasal congestion   ? Codeine Hives   ? Latex Hives   ? Oyster Nausea And Vomiting   ? Paba Forte Hives   ? Penicillins Hives     1950's         Review of Systems  No fevers or chills. No headache, lightheadedness or dizziness. No SOB, chest pains or palpitations. Appetite is good. No nausea, vomiting, constipation or diarrhea. No dysuria, frequency, burning or pain with urination.  Pain well controlled in right hip with Tylenol and oxycodone.  Otherwise review of systems are negative.     Physical Exam  PHYSICAL EXAMINATION:  Vital signs: /68, pulse 75, respirations 18, temperature 99.1, O2 sat 92% on room air.  Weight 122.2 pounds.  General: Awake, Alert,  oriented x3, appropriately, follows simple commands, conversant  HEENT:Pink conjunctiva, anicteric sclerae, moist oral mucosa  NECK: Supple  CVS:  S1  S2, without murmur or gallop.   LUNG: Clear to auscultation, No wheezes, rales or rhonci.  BACK: No kyphosis of the thoracic spine  ABDOMEN: Soft, nontender to palpation, with positive bowel sounds  EXTREMITIES: Movesboth upper and lower extremities with generalized weakness , 1+ pedal edema, no calf tenderness BRYANT hose on.   SKIN: Warm and dry, no rashes or erythema noted.  Incision to right hip intact with Tegaderm dressing.  NEUROLOGIC: Intact, pulses palpable  PSYCHIATRIC: Cognition intact pleasant affect.       Labs:   Lab Results   Component Value Date    WBC 10.3 05/28/2020    HGB 9.4 (L) 06/02/2020    HCT 37.2 05/28/2020    MCV 94 05/28/2020     05/28/2020     Results for orders placed or performed during the hospital encounter of 05/27/20   Basic Metabolic Panel   Result Value Ref Range    Sodium 138 136 - 145 mmol/L    Potassium 4.2 3.5 - 5.0 mmol/L    Chloride 104 98 - 107 mmol/L    CO2 27 22 - 31 mmol/L    Anion Gap, Calculation 7 5 - 18 mmol/L    Glucose 105 70 - 125 mg/dL    Calcium 8.4 (L) 8.5 - 10.5 mg/dL    BUN 6 (L) 8 - 28 mg/dL    Creatinine 0.59 (L) 0.60 - 1.10 mg/dL    GFR MDRD Af Amer >60 >60 mL/min/1.73m2    GFR MDRD Non Af Amer >60 >60 mL/min/1.73m2           Assessment/Plan:  1. Closed displaced fracture of right femoral neck (H)   patient TTWB.    2. History of right hip hemiarthroplasty   continue Xarelto for DVT prophylactics.  Lower extremity edema improving.    Continue BRYANT hose.  On the a.m. off at at bedtime.     3. Acute blood loss anemia   hemoglobin today 9.4.  Follow-up hemoglobin on Thursday.   4. Pain management   pain well controlled with Tylenol and oxycodone.           Electronically signed by: Cintia Cabrera, ESTHER

## 2021-06-20 NOTE — LETTER
Letter by Troy Butler DO at      Author: Troy Butler DO Service: -- Author Type: --    Filed:  Encounter Date: 6/1/2020 Status: (Other)         Patient: Jaye Soto   MR Number: [id number]   YOB: 1940   Date of Visit: 6/1/2020     [facility]      Facility:    [facility] [[id number]]  Code Status: UNKNOWN      Chief Complaint/Reason for Visit:  Chief Complaint   Patient presents with   ? H & P     Right femoral neck fracture with status post bipolar hemiarthroplasty, postoperative urinary retention that did resolve, acute blood loss anemia with hemoglobin declining.       HPI:   Jaye is a 79 y.o. female who was recently admitted to the hospital 5/27/2020 after a fall she sustained at home.  It was a mechanical fall with no signs of dizziness syncope or near syncope and she was then admitted to the hospital with a right femoral neck fracture.  She was seen by orthopedic surgery and underwent a bipolar hemiarthroplasty on 528 and there were no real complications of acute blood loss anemia but there is no report of any blood transfusions.  Hemoglobin down to 9.7 she was asymptomatic and she did have postoperative urinary intention however required straight cathing but this did resolve on discharge.  She claims she is urinating okay.    Patient's pain is well controlled she did a bowel movement yesterday.  She denies any other issues at this time and claims she is progressing as anticipated physical and Occupational Therapy.  Patient was seen from the door with social distancing I did auscultate the lungs and heart were in appropriate PPE at this time and there were no real issues.  Staff have no new concerns.    Past Medical History:  Past Medical History:   Diagnosis Date   ? Disease of thyroid gland     Hypothyroidism    ? Glaucoma     Follows w/ Dr. Nidia Denton in [place], [place]    ? Osteoporosis     Cervical and Lumbar spine injuries            Surgical History:  Past Surgical History:   Procedure Laterality Date   ? ABDOMINOPLASTY     ? EYE SURGERY Bilateral     for glaucoma   ? HYSTERECTOMY      Prolapsed uterus, ovaries in   ? MENISCECTOMY Right    ? NH PARTIAL HIP REPLACEMENT Right 5/28/2020    Procedure: HEMIARTHROPLASTY, HIP, BIPOLAR;  Surgeon: Alix Pichardo MD;  [facility];  Service: Orthopedics   ? TONSILLECTOMY         Family History:   Family History   Problem Relation Age of Onset   ? Glaucoma Father        Social History:    Social History     Socioeconomic History   ? Marital status:      Spouse name: None   ? Number of children: 2   ? Years of education: None   ? Highest education level: None   Occupational History   ? Occupation: RN   Social Needs   ? Financial resource strain: None   ? Food insecurity     Worry: None     Inability: None   ? Transportation needs     Medical: None     Non-medical: None   Tobacco Use   ? Smoking status: Never Smoker   ? Smokeless tobacco: Never Used   Substance and Sexual Activity   ? Alcohol use: No     Frequency: Never   ? Drug use: No   ? Sexual activity: Not Currently   Lifestyle   ? Physical activity     Days per week: None     Minutes per session: None   ? Stress: None   Relationships   ? Social connections     Talks on phone: None     Gets together: None     Attends [employer] service: None     Active member of club or organization: None     Attends meetings of clubs or organizations: None     Relationship status: None   ? Intimate partner violence     Fear of current or ex partner: None     Emotionally abused: None     Physically abused: None     Forced sexual activity: None   Other Topics Concern   ? None   Social History Narrative    Diet-        Exercise-        Born [place], just moved from [place]          Review of Systems   Constitutional:        Patient claims her pain is under adequate control denies any fever chills nausea vomit diarrhea change in vision hearing  taste or smell weakness one-sided chest pain shortness of breath.  There is no congeners stool polyphagia polydipsia polyuria depression or anxiety and remainder the review of systems is negative.       Vitals:    06/01/20 1107   BP: 111/66   Pulse: 89   Resp: 18   Temp: 97.9  F (36.6  C)   SpO2: 95%       Physical Exam  Constitutional:       General: She is not in acute distress.     Appearance: Normal appearance. She is not toxic-appearing.   HENT:      Head: Normocephalic and atraumatic.      Mouth/Throat:      Mouth: Mucous membranes are moist.      Pharynx: Oropharynx is clear.   Cardiovascular:      Rate and Rhythm: Normal rate and regular rhythm.   Pulmonary:      Effort: Pulmonary effort is normal. No respiratory distress.      Breath sounds: No wheezing or rales.   Abdominal:      General: Abdomen is flat.      Palpations: Abdomen is soft.   Musculoskeletal:      Right lower leg: No edema.      Left lower leg: No edema.   Skin:     General: Skin is warm and dry.   Neurological:      Mental Status: Mental status is at baseline.   Psychiatric:         Mood and Affect: Mood normal.         Behavior: Behavior normal.         Medication List:  Current Outpatient Medications   Medication Sig   ? cholecalciferol, vitamin D3, (VITAMIN D3) 1,000 unit capsule Take 1,000 Units by mouth daily.   ? cyanocobalamin 100 MCG tablet Take 100 mcg by mouth daily.   ? latanoprost (XALATAN) 0.005 % ophthalmic solution Administer 1 drop to both eyes at bedtime.    ? levothyroxine (SYNTHROID, LEVOTHROID) 100 MCG tablet TAKE 1 TABLET (100 MCG TOTAL) BY MOUTH DAILY.   ? LUTEIN EXTRACT-ZEAXANTHIN EXT ORAL Take 1 tablet by mouth daily.    ? lysine 500 mg Tab Take 500 mg by mouth daily.    ? oxyCODONE (ROXICODONE) 5 MG immediate release tablet Take 0.5 tablets (2.5 mg total) by mouth every 4 (four) hours as needed.   ? polyethylene glycol (MIRALAX) 17 gram packet Take 1 packet (17 g total) by mouth daily.   ? rivaroxaban ANTICOAGULANT  (XARELTO) 10 mg tablet Take 1 tablet (10 mg total) by mouth daily.   ? timolol maleate (TIMOPTIC) 0.5 % ophthalmic solution Administer 1 drop to both eyes 2 (two) times a day. Am and afternoon   ? turmeric/turmeric ext/pepr ext (TURMERIC-TURMERIC EXT-PEPPER) 500-3 mg cap Take 1 capsule by mouth daily.    ? ubidecarenone/vitamin E mixed (COQ10  ORAL) Take 1 tablet by mouth daily.        Labs: Hospital labs are as follows; hemoglobin went from 11.8-9.7 in the hospital.  Also conference of metabolic profile was basically within normal limits with the exception of a low calcium at 7.8 and bilirubin was 1.6 alk phosphatase ALT, AST were all within normal limits.      Assessment:    ICD-10-CM    1. Closed displaced fracture of right femoral neck (H)  S72.001A    2. History of right hip hemiarthroplasty  Z96.641    3. Pain management  R52    4. Acute blood loss anemia  D62        Plan: Plan at this time will check bilirubin at this time because of elevated in the hospital.  Is likely not a serious liver condition at this time but will continue to monitor.  Pain is well controlled so no new changes and will check a hemoglobin tomorrow.  We will continue to monitor above medical problems no other changes to care plan at this time.  Care plan was reviewed and is appropriate.        Electronically signed by: Troy Butler DO

## 2021-06-20 NOTE — LETTER
Letter by Cintia Cabrera CNP at      Author: Cintia Cabrera CNP Service: -- Author Type: --    Filed:  Encounter Date: 6/4/2020 Status: (Other)         Patient: Jaye Soto   MR Number: [id number]   YOB: 1940   Date of Visit: 6/4/2020     Code Status:  DNR  Visit Type: Problem Visit     Facility:  [facility] [[id number]]             History of Present Illness: Jaye Soto is a 79 y.o. female who I am seeing today for follow up on the TCU. Pt recently hospitalized on 5/27/2020.  History includes hypothyroidism.  Patient presented to the ER after a mechanical fall in which she slipped over her wheeled wardrobe and fell onto the right side.  Patient found to have a right femoral neck fracture.  Patient status post right bipolar hemiarthroplasty on 5/28/2020.  Patient had mild acute blood loss anemia postoperative with hemoglobin down to 9.7.  Asymptomatic.  Tolerated well.  She also had postop urinary retention initially and required straight cath but later resolved.      Today patient lying in bed.  In edema to bilateral lower extremities improving.  She continues on BRYANT hose.  Cision to right hip intact with Tegaderm dressing.  Pain well-controlled Tylenol and oxycodone.  Patient continues on Xarelto for DVT prophylactics.    Active Ambulatory Problems     Diagnosis Date Noted   ? Hypothyroidism 01/14/2019   ? Glaucoma 01/14/2019   ? Closed right hip fracture, initial encounter (H) 05/27/2020   ? Closed displaced fracture of right femoral neck (H) 05/28/2020     Resolved Ambulatory Problems     Diagnosis Date Noted   ? No Resolved Ambulatory Problems     Past Medical History:   Diagnosis Date   ? Disease of thyroid gland    ? Osteoporosis        Current Outpatient Medications   Medication Sig   ? cholecalciferol, vitamin D3, (VITAMIN D3) 1,000 unit capsule Take 1,000 Units by mouth daily.   ? cyanocobalamin 100 MCG tablet Take 100 mcg by mouth daily.   ?  latanoprost (XALATAN) 0.005 % ophthalmic solution Administer 1 drop to both eyes at bedtime.    ? levothyroxine (SYNTHROID, LEVOTHROID) 100 MCG tablet TAKE 1 TABLET (100 MCG TOTAL) BY MOUTH DAILY.   ? LUTEIN EXTRACT-ZEAXANTHIN EXT ORAL Take 1 tablet by mouth daily.    ? lysine 500 mg Tab Take 500 mg by mouth daily.    ? oxyCODONE (ROXICODONE) 5 MG immediate release tablet Take 0.5 tablets (2.5 mg total) by mouth every 4 (four) hours as needed.   ? polyethylene glycol (MIRALAX) 17 gram packet Take 1 packet (17 g total) by mouth daily.   ? rivaroxaban ANTICOAGULANT (XARELTO) 10 mg tablet Take 1 tablet (10 mg total) by mouth daily.   ? timolol maleate (TIMOPTIC) 0.5 % ophthalmic solution Administer 1 drop to both eyes 2 (two) times a day. Am and afternoon   ? turmeric/turmeric ext/pepr ext (TURMERIC-TURMERIC EXT-PEPPER) 500-3 mg cap Take 1 capsule by mouth daily.    ? ubidecarenone/vitamin E mixed (COQ10  ORAL) Take 1 tablet by mouth daily.        Allergies   Allergen Reactions   ? Ceftin [Cefuroxime Axetil] Anaphylaxis     Tongue swelling   ? Dorzolamide Shortness Of Breath and Itching     Short of breath,    ? Naprosyn [Naproxen] Anaphylaxis     Tongue swelling, throat closing   ? Cat Dander Cough     Nasal congestion   ? Codeine Hives   ? Latex Hives   ? Oyster Nausea And Vomiting   ? Paba Forte Hives   ? Penicillins Hives     1950's         Review of Systems  No fevers or chills. No headache, lightheadedness or dizziness. No SOB, chest pains or palpitations. Appetite is good. No nausea, vomiting, constipation or diarrhea. No dysuria, frequency, burning or pain with urination.  Pain well controlled in right hip with Tylenol and oxycodone.  Otherwise review of systems are negative.     Physical Exam  PHYSICAL EXAMINATION:  Vital signs: /60, pulse 73, respirations 20, temperature 98.6, O2 sat 93% on room air.  Weight 122.2 pounds.  General: Awake, Alert, oriented x3, appropriately, follows simple commands,  conversant  HEENT:Pink conjunctiva, anicteric sclerae, moist oral mucosa  NECK: Supple  CVS:  S1  S2, without murmur or gallop.   LUNG: Clear to auscultation, No wheezes, rales or rhonci.  BACK: No kyphosis of the thoracic spine  ABDOMEN: Soft, nontender to palpation, with positive bowel sounds  EXTREMITIES: Movesboth upper and lower extremities with generalized weakness , 21+ pedal edema, no calf tenderness BRYANT hose on.   SKIN: Warm and dry, no rashes or erythema noted.  Incision to right hip intact with Tegaderm dressing.  NEUROLOGIC: Intact, pulses palpable  PSYCHIATRIC: Cognition intact pleasant affect.       Labs:   Lab Results   Component Value Date    WBC 10.3 05/28/2020    HGB 9.4 (L) 06/02/2020    HCT 37.2 05/28/2020    MCV 94 05/28/2020     05/28/2020     Results for orders placed or performed during the hospital encounter of 05/27/20   Basic Metabolic Panel   Result Value Ref Range    Sodium 138 136 - 145 mmol/L    Potassium 4.2 3.5 - 5.0 mmol/L    Chloride 104 98 - 107 mmol/L    CO2 27 22 - 31 mmol/L    Anion Gap, Calculation 7 5 - 18 mmol/L    Glucose 105 70 - 125 mg/dL    Calcium 8.4 (L) 8.5 - 10.5 mg/dL    BUN 6 (L) 8 - 28 mg/dL    Creatinine 0.59 (L) 0.60 - 1.10 mg/dL    GFR MDRD Af Amer >60 >60 mL/min/1.73m2    GFR MDRD Non Af Amer >60 >60 mL/min/1.73m2           Assessment/Plan:  1. Closed displaced fracture of right femoral neck (H)   patient TTWB.    2. History of right hip hemiarthroplasty   continue Xarelto for DVT prophylactics.  Lower extremity edema.  Continue BRYANT hose.  On the a.m. off at at bedtime.     3. Acute blood loss anemia   hemoglobin today 9.7.  Follow-up hemoglobin in 1 week.   4. Pain management   pain well controlled with Tylenol and oxycodone.     Pt will need a standard idania wheelchair upon discharge. Pt with femur fracture ORIF. Pt is TTWB RLE. She has mobility limitations impairing her ability to participate in ADLS such toileting, feeding, dressing, grooming and  bathing in customary locations in the home without wheelchair.[place] height due to short stature and feet not touching the ground. Light weight for each of patient/caregiver to use and transport.  Patient/cargiver are able to safely use a manual wheelchair. Pt's functional mobility deficit can be sufficiently resolved by the use of a manual wheel chair. Patients mobility can't be safely resolved by use of a cane, walker or crutches.       Electronically signed by: Cintia Cabrera CNP

## 2021-06-23 NOTE — TELEPHONE ENCOUNTER
I cannot fill the thyroid medication without a dose, however I certainly will refill on Monday when I see her, she does not need to stop taking any of her supplements.

## 2021-06-23 NOTE — PROGRESS NOTES
ASSESSMENT:  1. Hypothyroidism  Patient is on thyroid replacement.  - levothyroxine (SYNTHROID, LEVOTHROID) 75 MCG tablet; Take 1 tablet (75 mcg total) by mouth daily.  Dispense: 90 tablet; Refill: 3  - Thyroid Stimulating Hormone (TSH)    2. Glaucoma  Followed by ophthalmology, patient is having limitations because of her glaucoma.    3. Screening for lipid disorders  No known history of hyperlipidemia or diabetes mellitus.  - Lipid Cascade  - Basic Metabolic Panel        PLAN:  1.  High-dose influenza vaccine.  2.  Attempt to obtain outpatient records, per patient she is up-to-date on preventive maintenance issues.  3.  I did refill the patient's thyroid medication  4.  Laboratory studies as above.  5.  Patient has regular follow-up with ophthalmology.  6.  Follow-up in 3-6 months.    Orders Placed This Encounter   Procedures     Influenza High Dose, Seasonal 65+ yrs     Thyroid Stimulating Hormone (TSH)     Lipid Cascade     Order Specific Question:   Fasting is required?     Answer:   Yes     Basic Metabolic Panel     Medications Discontinued During This Encounter   Medication Reason     levothyroxine (SYNTHROID, LEVOTHROID) 75 MCG tablet Reorder       Return in about 6 months (around 7/14/2019) for Recheck.    CHIEF COMPLAINT:  Chief Complaint   Patient presents with     [facility]       SUBJECTIVE:  Jaye is a 78 y.o. female who presents to [facility]. Patient states that she moved to [place] from [place] three months ago. She mentions that since moving she has seen an eye specialist for her glaucoma. She uses eye drops which helps control her glaucoma. She has limited vision so she does not drive anymore. She is unable to read or do photography as much as she would like due to her vision. She explains that her blood pressure of 165/94 in the office today is unusually high and denies having a history of hypertension. She experiences occasional hot flashes. She denies any bladder or digestive  problems. She has some joint pain but it does not limit her and she stays active.       REVIEW OF SYSTEMS:   All other systems are negative.    PFSH:  Surgery: She had a hysterectomy. She had abdominoplasty. She had right knee surgery to repair meniscus. She has had multiple surgeries on her eyes for her glaucoma.   Family: Her father had glaucoma and passed away in his 90s. She has two sisters and one brother. One sister has passed away. Her siblings have eye issues. Her son passed away in his 40's.     Immunization History   Administered Date(s) Administered     Influenza high dose, seasonal 01/14/2019     Social History     Socioeconomic History     Marital status:      Spouse name: Not on file     Number of children: 2     Years of education: Not on file     Highest education level: Not on file   Social Needs     Financial resource strain: Not on file     Food insecurity - worry: Not on file     Food insecurity - inability: Not on file     Transportation needs - medical: Not on file     Transportation needs - non-medical: Not on file   Occupational History     Occupation: RN   Tobacco Use     Smoking status: Never Smoker     Smokeless tobacco: Never Used   Substance and Sexual Activity     Alcohol use: No     Frequency: Never     Drug use: No     Sexual activity: Not Currently   Other Topics Concern     Not on file   Social History Narrative    Diet-        Exercise-        Born [place], just moved from [place]     Past Medical History:   Diagnosis Date     Disease of thyroid gland     Hypothyroidism      Glaucoma     Follows w/ Dr. Nidia Denton in [place], [place]      Osteoporosis     Cervical and Lumbar spine injuries     Family History   Problem Relation Age of Onset     Glaucoma Father        MEDICATIONS:  Current Outpatient Medications   Medication Sig Dispense Refill     cholecalciferol, vitamin D3, (VITAMIN D3) 1,000 unit capsule Take 1,000 Units by mouth daily.       cyanocobalamin 100 MCG tablet Take 100  mcg by mouth daily.       latanoprost (XALATAN) 0.005 % ophthalmic solution        levothyroxine (SYNTHROID, LEVOTHROID) 75 MCG tablet Take 1 tablet (75 mcg total) by mouth daily. 90 tablet 3     LUTEIN EXTRACT-ZEAXANTHIN EXT ORAL Take by mouth.       lysine 500 mg Tab Take by mouth daily.       magnesium 250 mg Tab Take by mouth.       timolol maleate (TIMOPTIC) 0.5 % ophthalmic solution        turmeric/turmeric ext/pepr ext (TURMERIC-TURMERIC EXT-PEPPER) 500-3 mg cap Take by mouth.       ubidecarenone/vitamin E mixed (COQ10  ORAL) Take by mouth.       No current facility-administered medications for this visit.        TOBACCO USE:  Social History     Tobacco Use   Smoking Status Never Smoker   Smokeless Tobacco Never Used       VITALS:  Vitals:    01/14/19 1005   BP: (!) 165/94   Pulse: 72   SpO2: 95%   Weight: 108 lb (49 kg)     Wt Readings from Last 3 Encounters:   01/14/19 108 lb (49 kg)       PHYSICAL EXAM:  Constitutional:   Reveals a female who appears overall very healthy.  Vitals: per nursing notes.  HEENT:  Ears:  External canals, TMs clear.    Eyes:  EOMs full, PERRL.  Lungs: Clear to A&P without rales or wheezes.  Respiratory effort normal.  Cardiac:   Regular rate and rhythm, normal S1, S2, no murmur or gallop.  Musculoskeletal: No peripheral swelling.  Neuro:  Alert and oriented. Cranial nerves, motor, sensory exams are intact.  No gross focal deficits.  Psychiatric:  Memory intact, mood appropriate.    QUALITY MEASURES:      DATA REVIEWED:  Additional History from Old Records Summarized (2):   Decision to Obtain Records (1):   Radiology Tests Summarized or Ordered (1):   Labs Reviewed or Ordered (1): Ordered labs.   Medicine Test Summarized or Ordered (1):   Independent Review of EKG, X-RAY, or RAPID STREP (2 each):     The visit lasted a total of 30 minutes face to face with the patient. Over 50% of the time was spent counseling and educating the patient about her above concerns.    By signing  my name below, I, Dar Alba, attest that this documentation has been prepared under the direction and in the presence of Dr. Sabas Roe.  Electronic Signature: Kenan Sommer. 1/14/2019 10:09 AM.    I, Dr. Roe, personally performed the services described in this documentation. All medical record entries made by the scribe were at my direction and in my presence. I have reviewed the chart and discharge instructions (if applicable) and agree that the record reflects my personal performance and is accurate and complete.      Total data points: 1

## 2021-06-23 NOTE — PATIENT INSTRUCTIONS - HE
I will mail lab copies.  See us in 3-6 months.  If you have a chance to have your blood pressure checked goal is less than 140/90.

## 2021-06-23 NOTE — TELEPHONE ENCOUNTER
RN triage   Call from pt   Pt recently moved to [place] from [place] and [facility] [place] -- appt scheduled 1/ 14  Pt takes levothyroxine and will need a refill -- and thryoid labs   Pt states she takes supplements and wants to know if she should stop taking them before her appt   Pt takes B12- D3 - lysine - lutin zeaxanthin - tumeric - curcumin farnaz - max Q10 with ultra PQQ daily -- pt also takes magnesium PRN for joint pain , not daily   Please advise -- do you want pt to stop supplements before appt on 1/14?   OK to leave detailed message on pt voice mail   Jaye Barnes RN [facility] RN triage

## 2021-07-03 NOTE — ADDENDUM NOTE
Addendum Note by Ramu Marquis CNP at 9/30/2020  1:10 PM     Author: Ramu Marquis CNP Service: -- Author Type: Nurse Practitioner    Filed: 10/1/2020  7:41 AM Encounter Date: 9/30/2020 Status: Signed    : Ramu Marquis CNP (Nurse Practitioner)    Addended by: RAMU MARQUIS on: 10/1/2020 07:41 AM        Modules accepted: Orders